# Patient Record
Sex: FEMALE | Race: BLACK OR AFRICAN AMERICAN | NOT HISPANIC OR LATINO | Employment: OTHER | RURAL
[De-identification: names, ages, dates, MRNs, and addresses within clinical notes are randomized per-mention and may not be internally consistent; named-entity substitution may affect disease eponyms.]

---

## 2021-03-23 ENCOUNTER — OFFICE VISIT (OUTPATIENT)
Dept: FAMILY MEDICINE | Facility: CLINIC | Age: 56
End: 2021-03-23
Payer: COMMERCIAL

## 2021-03-23 VITALS
SYSTOLIC BLOOD PRESSURE: 156 MMHG | BODY MASS INDEX: 31.22 KG/M2 | TEMPERATURE: 98 F | OXYGEN SATURATION: 100 % | RESPIRATION RATE: 18 BRPM | WEIGHT: 206 LBS | HEIGHT: 68 IN | HEART RATE: 81 BPM | DIASTOLIC BLOOD PRESSURE: 94 MMHG

## 2021-03-23 DIAGNOSIS — M79.641 RIGHT HAND PAIN: ICD-10-CM

## 2021-03-23 DIAGNOSIS — C54.1 ENDOMETRIAL CANCER: ICD-10-CM

## 2021-03-23 DIAGNOSIS — M79.605 PAIN IN BOTH LOWER EXTREMITIES: ICD-10-CM

## 2021-03-23 DIAGNOSIS — R03.0 ELEVATED BP WITHOUT DIAGNOSIS OF HYPERTENSION: ICD-10-CM

## 2021-03-23 DIAGNOSIS — R22.1 NECK MASS: ICD-10-CM

## 2021-03-23 DIAGNOSIS — M79.604 PAIN IN BOTH LOWER EXTREMITIES: ICD-10-CM

## 2021-03-23 DIAGNOSIS — D06.9 CIN III (CERVICAL INTRAEPITHELIAL NEOPLASIA GRADE III) WITH SEVERE DYSPLASIA: ICD-10-CM

## 2021-03-23 DIAGNOSIS — E78.5 HYPERLIPIDEMIA, UNSPECIFIED HYPERLIPIDEMIA TYPE: ICD-10-CM

## 2021-03-23 DIAGNOSIS — K21.9 GASTROESOPHAGEAL REFLUX DISEASE WITHOUT ESOPHAGITIS: Primary | ICD-10-CM

## 2021-03-23 DIAGNOSIS — R87.619 ATYPICAL GLANDULAR CELLS OF UNDETERMINED SIGNIFICANCE (AGUS) ON CERVICAL PAP SMEAR: ICD-10-CM

## 2021-03-23 DIAGNOSIS — I87.2 PERIPHERAL VENOUS INSUFFICIENCY: ICD-10-CM

## 2021-03-23 PROCEDURE — 99214 PR OFFICE/OUTPT VISIT, EST, LEVL IV, 30-39 MIN: ICD-10-PCS | Mod: 25,,, | Performed by: NURSE PRACTITIONER

## 2021-03-23 PROCEDURE — 96372 THER/PROPH/DIAG INJ SC/IM: CPT | Mod: ,,, | Performed by: NURSE PRACTITIONER

## 2021-03-23 PROCEDURE — 99214 OFFICE O/P EST MOD 30 MIN: CPT | Mod: 25,,, | Performed by: NURSE PRACTITIONER

## 2021-03-23 PROCEDURE — 96372 PR INJECTION,THERAP/PROPH/DIAG2ST, IM OR SUBCUT: ICD-10-PCS | Mod: ,,, | Performed by: NURSE PRACTITIONER

## 2021-03-23 RX ORDER — KETOROLAC TROMETHAMINE 30 MG/ML
60 INJECTION, SOLUTION INTRAMUSCULAR; INTRAVENOUS ONCE
Status: COMPLETED | OUTPATIENT
Start: 2021-03-23 | End: 2021-03-23

## 2021-03-23 RX ORDER — METHYLPREDNISOLONE ACETATE 40 MG/ML
40 INJECTION, SUSPENSION INTRA-ARTICULAR; INTRALESIONAL; INTRAMUSCULAR; SOFT TISSUE ONCE
Status: COMPLETED | OUTPATIENT
Start: 2021-03-23 | End: 2021-03-23

## 2021-03-23 RX ORDER — IBUPROFEN 800 MG/1
800 TABLET ORAL 3 TIMES DAILY
Qty: 90 TABLET | Refills: 2 | Status: SHIPPED | OUTPATIENT
Start: 2021-03-23 | End: 2021-06-21

## 2021-03-23 RX ORDER — DEXAMETHASONE SODIUM PHOSPHATE 4 MG/ML
4 INJECTION, SOLUTION INTRA-ARTICULAR; INTRALESIONAL; INTRAMUSCULAR; INTRAVENOUS; SOFT TISSUE ONCE
Status: COMPLETED | OUTPATIENT
Start: 2021-03-23 | End: 2021-03-23

## 2021-03-23 RX ORDER — GABAPENTIN 300 MG/1
300 CAPSULE ORAL NIGHTLY
COMMUNITY
Start: 2020-06-15

## 2021-03-23 RX ORDER — TIZANIDINE 4 MG/1
4 TABLET ORAL EVERY 6 HOURS PRN
Qty: 60 TABLET | Refills: 2 | Status: SHIPPED | OUTPATIENT
Start: 2021-03-23 | End: 2021-06-21

## 2021-03-23 RX ADMIN — DEXAMETHASONE SODIUM PHOSPHATE 4 MG: 4 INJECTION, SOLUTION INTRA-ARTICULAR; INTRALESIONAL; INTRAMUSCULAR; INTRAVENOUS; SOFT TISSUE at 09:03

## 2021-03-23 RX ADMIN — KETOROLAC TROMETHAMINE 60 MG: 30 INJECTION, SOLUTION INTRAMUSCULAR; INTRAVENOUS at 09:03

## 2021-03-23 RX ADMIN — METHYLPREDNISOLONE ACETATE 40 MG: 40 INJECTION, SUSPENSION INTRA-ARTICULAR; INTRALESIONAL; INTRAMUSCULAR; SOFT TISSUE at 09:03

## 2021-04-20 ENCOUNTER — OFFICE VISIT (OUTPATIENT)
Dept: FAMILY MEDICINE | Facility: CLINIC | Age: 56
End: 2021-04-20
Payer: COMMERCIAL

## 2021-04-20 VITALS
WEIGHT: 204.38 LBS | OXYGEN SATURATION: 97 % | SYSTOLIC BLOOD PRESSURE: 133 MMHG | HEIGHT: 67 IN | HEART RATE: 92 BPM | BODY MASS INDEX: 32.08 KG/M2 | DIASTOLIC BLOOD PRESSURE: 85 MMHG | TEMPERATURE: 97 F | RESPIRATION RATE: 16 BRPM

## 2021-04-20 DIAGNOSIS — G62.9 NEUROPATHY: ICD-10-CM

## 2021-04-20 DIAGNOSIS — I87.2 PERIPHERAL VENOUS INSUFFICIENCY: ICD-10-CM

## 2021-04-20 DIAGNOSIS — M62.838 MUSCLE SPASM: ICD-10-CM

## 2021-04-20 DIAGNOSIS — M25.50 MULTIPLE JOINT PAIN: Primary | ICD-10-CM

## 2021-04-20 PROCEDURE — 99214 OFFICE O/P EST MOD 30 MIN: CPT | Mod: ,,, | Performed by: NURSE PRACTITIONER

## 2021-04-20 PROCEDURE — 99214 PR OFFICE/OUTPT VISIT, EST, LEVL IV, 30-39 MIN: ICD-10-PCS | Mod: ,,, | Performed by: NURSE PRACTITIONER

## 2021-04-22 PROBLEM — M62.838 MUSCLE SPASM: Status: ACTIVE | Noted: 2021-04-22

## 2021-04-22 PROBLEM — M25.50 MULTIPLE JOINT PAIN: Status: ACTIVE | Noted: 2021-04-22

## 2021-04-22 PROBLEM — G62.9 NEUROPATHY: Status: ACTIVE | Noted: 2021-04-22

## 2022-02-09 ENCOUNTER — HOSPITAL ENCOUNTER (OUTPATIENT)
Dept: RADIOLOGY | Facility: HOSPITAL | Age: 57
Discharge: HOME | End: 2022-02-09
Attending: NURSE PRACTITIONER
Payer: COMMERCIAL

## 2022-02-09 ENCOUNTER — OFFICE VISIT (OUTPATIENT)
Dept: VASCULAR SURGERY | Facility: CLINIC | Age: 57
End: 2022-02-09
Payer: COMMERCIAL

## 2022-02-09 VITALS
HEIGHT: 67 IN | BODY MASS INDEX: 31.05 KG/M2 | HEART RATE: 72 BPM | SYSTOLIC BLOOD PRESSURE: 130 MMHG | RESPIRATION RATE: 12 BRPM | WEIGHT: 197.81 LBS | DIASTOLIC BLOOD PRESSURE: 80 MMHG

## 2022-02-09 DIAGNOSIS — R60.0 LOWER EXTREMITY EDEMA: Primary | ICD-10-CM

## 2022-02-09 DIAGNOSIS — M79.605 PAIN IN BOTH LOWER EXTREMITIES: ICD-10-CM

## 2022-02-09 DIAGNOSIS — R60.0 LOWER EXTREMITY EDEMA: ICD-10-CM

## 2022-02-09 DIAGNOSIS — M79.604 PAIN IN BOTH LOWER EXTREMITIES: ICD-10-CM

## 2022-02-09 DIAGNOSIS — I87.2 CHRONIC VENOUS INSUFFICIENCY OF LOWER EXTREMITY: ICD-10-CM

## 2022-02-09 PROCEDURE — 3075F SYST BP GE 130 - 139MM HG: CPT | Mod: CPTII,,, | Performed by: NURSE PRACTITIONER

## 2022-02-09 PROCEDURE — 93970 EXTREMITY STUDY: CPT | Mod: 26,,, | Performed by: FAMILY MEDICINE

## 2022-02-09 PROCEDURE — 1159F PR MEDICATION LIST DOCUMENTED IN MEDICAL RECORD: ICD-10-PCS | Mod: CPTII,,, | Performed by: NURSE PRACTITIONER

## 2022-02-09 PROCEDURE — 3008F PR BODY MASS INDEX (BMI) DOCUMENTED: ICD-10-PCS | Mod: CPTII,,, | Performed by: NURSE PRACTITIONER

## 2022-02-09 PROCEDURE — 99204 PR OFFICE/OUTPT VISIT, NEW, LEVL IV, 45-59 MIN: ICD-10-PCS | Mod: S$PBB,,, | Performed by: NURSE PRACTITIONER

## 2022-02-09 PROCEDURE — 3075F PR MOST RECENT SYSTOLIC BLOOD PRESS GE 130-139MM HG: ICD-10-PCS | Mod: CPTII,,, | Performed by: NURSE PRACTITIONER

## 2022-02-09 PROCEDURE — 99204 OFFICE O/P NEW MOD 45 MIN: CPT | Mod: S$PBB,,, | Performed by: NURSE PRACTITIONER

## 2022-02-09 PROCEDURE — 99213 OFFICE O/P EST LOW 20 MIN: CPT | Mod: PBBFAC,25 | Performed by: NURSE PRACTITIONER

## 2022-02-09 PROCEDURE — 3079F PR MOST RECENT DIASTOLIC BLOOD PRESSURE 80-89 MM HG: ICD-10-PCS | Mod: CPTII,,, | Performed by: NURSE PRACTITIONER

## 2022-02-09 PROCEDURE — 93970 EXTREMITY STUDY: CPT | Mod: TC

## 2022-02-09 PROCEDURE — 93970 US VENOUS REFLUX STUDY BILATERAL: ICD-10-PCS | Mod: 26,,, | Performed by: FAMILY MEDICINE

## 2022-02-09 PROCEDURE — 3008F BODY MASS INDEX DOCD: CPT | Mod: CPTII,,, | Performed by: NURSE PRACTITIONER

## 2022-02-09 PROCEDURE — 3079F DIAST BP 80-89 MM HG: CPT | Mod: CPTII,,, | Performed by: NURSE PRACTITIONER

## 2022-02-09 PROCEDURE — 1159F MED LIST DOCD IN RCRD: CPT | Mod: CPTII,,, | Performed by: NURSE PRACTITIONER

## 2022-02-09 RX ORDER — ALBUTEROL SULFATE 90 UG/1
AEROSOL, METERED RESPIRATORY (INHALATION)
COMMUNITY
Start: 2022-01-19

## 2022-02-09 RX ORDER — NAPROXEN 500 MG/1
500 TABLET ORAL 2 TIMES DAILY WITH MEALS
COMMUNITY
Start: 2021-12-14 | End: 2022-02-16 | Stop reason: SDUPTHER

## 2022-02-09 RX ORDER — IBUPROFEN 600 MG/1
TABLET ORAL
COMMUNITY
Start: 2022-01-19 | End: 2024-03-21

## 2022-02-09 RX ORDER — DULOXETIN HYDROCHLORIDE 30 MG/1
CAPSULE, DELAYED RELEASE ORAL
COMMUNITY
Start: 2021-12-14 | End: 2022-02-16 | Stop reason: SDUPTHER

## 2022-02-09 RX ORDER — TIZANIDINE 4 MG/1
4 TABLET ORAL EVERY 6 HOURS PRN
COMMUNITY

## 2022-02-09 NOTE — PROGRESS NOTES
[facility] CLINIC NOTE    Patient ID: Kaylyn Silva is a 56 y.o. female.    I. HISTORY     Chief Complaint:   Chief Complaint   Patient presents with    Referral     New pt referred by BETH Norwood for bilateral leg pain and edema.    Edema    Leg Pain        HPI: Kaylyn Silva is a 56 y.o. female who is referred here today by BETH Norwood  for evaluation of bilateral leg pain & swelling.  Symptoms are as described in the chart below and began a few  years ago.  Location is bilateral lower legs. Symptoms are progressive at the end of the day.  History of venous interventions includes None.  No known family history of venous disease.  No history of DVT. No history of HF or RF. Patient walks with assist of a cane she reports significant Neuropathy after having Chemotherapy treatments for treatment of Ovarian Cancer in 2017.     Venous Disease Medical Necessity Documentation Initial Visit Date: 2-9-22 Return Check Date:    1. Have you ever had a rupture or bleed from a varicose vein in your leg(s)?              [] Yes  [x] No   [] Yes   [] No   2. Have you ever been diagnosed with phlebitis, cellulitis, or inflammation in the area of the varicose veins of  your leg(s)?  [] Yes  [x] No    [] Yes   [] No   3. Do you have darkened or inflamed skin on your legs?   [] Yes   [x] No   [] Yes   [] No   4. Do you have leg swelling?     [x] Yes   [] No   [] Yes   [] No   5. Do you have leg pain?   [x] Yes   [] No   [] Yes   [] No   If yes, describe the type of pain?    [x]   Stabbing [x]  Radiating [x]  Aching   []  Tightness [x]  Throbbing                 [x]  Burning [x]  Cramping              6. Do you have leg discomfort?   [x] Yes   [] No   [] Yes   [] No   If yes, describe the type of discomfort?    [x]  Heaviness []  Fullness   [x]  Restlessness [x] Tired/Fatigued [] Itching              7. Have you ever worn compression hose?   [] Yes   [x] No   [] Yes   [] No   If yes, how long?           8. Do you  elevate your legs while sitting?   [x] Yes   [] No   [] Yes   [] No   9. Does venous disease (varicose veins, ulcers, skin changes, leg pain/swelling) interfere with your daily life?  If yes, check activities you are limited or unable to do.    []  Shower  []   Walk  []  Exercise  [x] Play with children/grandchildren  []  Shop [] Work [x] Stand for any period of time [x] Sleep                                 [x] Sitting for an extended period of time.           [x] Yes   [] No   [] Yes   [] No   10. Do you exercise/have you tried to exercise (i.e.  Walk our participate in a regular exercise routine)?  [x] Yes, calf raises and walks  [] No   [] Yes   [] No   11. BMI   31           Past Medical History:   Diagnosis Date    Anxiety state 2012    Chemotherapy-induced neuropathy     Depression, unspecified     Edema of lower extremity     Endometrial cancer     GERD (gastroesophageal reflux disease) 2012    Pain in leg, unspecified     Paresthesia of skin         Past Surgical History:   Procedure Laterality Date     SECTION      2 times    HAND SURGERY Left 1985    partial amp of thumb and index finger    HYSTERECTOMY      MASS EXCISION      Back of neck    VAGINAL DELIVERY      X1       Social History     Tobacco Use   Smoking Status Never Smoker   Smokeless Tobacco Never Used         Current Outpatient Medications:     albuterol (PROVENTIL/VENTOLIN HFA) 90 mcg/actuation inhaler, INHALE 1 PUFF BY MOUTH EVERY 4 HOURS AS NEEDED ...SHAKE WELL BEFORE USING..., Disp: , Rfl:     DULoxetine (CYMBALTA) 30 MG capsule, TAKE 1 CAPSULE BY MOUTH EVERY DAY CAUSES DROWSINESS-AVOID ALCOHOL!!, Disp: , Rfl:     gabapentin (NEURONTIN) 300 MG capsule, Take 300 mg by mouth every evening., Disp: , Rfl:     ibuprofen (ADVIL,MOTRIN) 600 MG tablet, TAKE 1 TABLET BY MOUTH 4 TIMES DAILY WITH FOOD OR MILK ...STOP TAKING ALL OTHER NSAIDS WHILE TAKING THIS MEDICATION, Disp: , Rfl:     naproxen (NAPROSYN) 500 MG  tablet, Take 500 mg by mouth 2 (two) times daily with meals. As needed for pain, Disp: , Rfl:     tiZANidine (ZANAFLEX) 4 MG tablet, Take 4 mg by mouth every 6 (six) hours as needed (muscle spasms)., Disp: , Rfl:     Review of Systems   Constitutional: Negative for fatigue and fever.   Eyes: Negative for pain.   Respiratory: Negative for chest tightness and shortness of breath.    Cardiovascular: Positive for leg swelling. Negative for chest pain.   Gastrointestinal: Negative for abdominal pain.   Musculoskeletal: Positive for leg pain.   Neurological: Negative for syncope.   Psychiatric/Behavioral: Negative for sleep disturbance.          II. PHYSICAL EXAM     Physical Exam  Vitals reviewed.   Constitutional:       General: She is not in acute distress.     Appearance: She is obese.   HENT:      Head: Normocephalic.   Eyes:      Pupils: Pupils are equal, round, and reactive to light.   Cardiovascular:      Rate and Rhythm: Normal rate and regular rhythm.      Comments: Biphasic dopplerable signals both feet.   Pulmonary:      Effort: Pulmonary effort is normal.   Abdominal:      Palpations: Abdomen is soft.   Musculoskeletal:         General: No swelling. Normal range of motion.      Cervical back: Neck supple.      Right lower leg: Edema present.      Left lower leg: Edema present.      Comments: 02/09/2022 measurements:  Trunk: 116.5 cm  Right thigh: 64.5 cm, calf: 38 cm, ankle: 23 cm  Left Thigh: 63 cm, calf: 40 cm, ankle: 23.5 cm   Skin:     General: Skin is warm and dry.   Neurological:      Mental Status: She is alert and oriented to person, place, and time.           CEAP Classification  Clinical Signs: Class 3 - Edema  Etiologic Classification: Secondary  Anatomic distribution: Superficial  Pathophysiologic dysfunction: Reflux       Venous Clinical Severity Score  Pain:2=Daily, moderate activity limitation, occasional analgesics  Varicose Veins: 1=Few, scattered. Branch varicose veins  Venous Edema:  3=Morning edema above ankle and requiring activity change, elevation    Inflammation: 0=None  Induration: 0=None  Number of Active Ulcers: 0=0  Active Ulceration, Duration: 0=None  Active Ulcer Size: 0=None  Compressive Therapy: 0=Not used or not compliant         III. ASSESSMENT & PLAN (MEDICAL DECISION MAKING)     1. Lower extremity edema    2. Pain in both lower extremities    3. Chronic venous insufficiency of lower extremity        Assessment/Diagnosis and Plan:  Patient has complaints, symptoms and physical exam findings of chronic venous disease.  Therefore, I will order a bilateral complete venous reflux study and see the patient back with results. Venous reflux study performed today showed reflux in bilateral GSV's and the right SSV . Study negative for DVT. Thorough discussion of test findings and plan of care. She verbalized understanding and agrees with plan.  Will start conservative management of 20-30 mmHg compression, therapeutic leg elevation and calf pumping exercises. FU in 3 months with Dr. Gomes.     Orders Placed This Encounter    US Venous Reflux Study Bilateral          BETH Anthony

## 2022-02-16 ENCOUNTER — OFFICE VISIT (OUTPATIENT)
Dept: NEUROLOGY | Facility: CLINIC | Age: 57
End: 2022-02-16
Payer: COMMERCIAL

## 2022-02-16 VITALS
BODY MASS INDEX: 31.29 KG/M2 | DIASTOLIC BLOOD PRESSURE: 84 MMHG | HEART RATE: 91 BPM | OXYGEN SATURATION: 97 % | WEIGHT: 199.38 LBS | HEIGHT: 67 IN | SYSTOLIC BLOOD PRESSURE: 122 MMHG

## 2022-02-16 DIAGNOSIS — G47.30 SLEEP APNEA, UNSPECIFIED TYPE: ICD-10-CM

## 2022-02-16 DIAGNOSIS — G60.9 IDIOPATHIC PERIPHERAL NEUROPATHY: ICD-10-CM

## 2022-02-16 DIAGNOSIS — R51.9 INTRACTABLE HEADACHE, UNSPECIFIED CHRONICITY PATTERN, UNSPECIFIED HEADACHE TYPE: Primary | ICD-10-CM

## 2022-02-16 DIAGNOSIS — G56.03 BILATERAL CARPAL TUNNEL SYNDROME: ICD-10-CM

## 2022-02-16 PROBLEM — T45.1X5A CHEMOTHERAPY-INDUCED NEUROPATHY: Status: ACTIVE | Noted: 2021-06-14

## 2022-02-16 PROBLEM — G62.0 CHEMOTHERAPY-INDUCED NEUROPATHY: Status: ACTIVE | Noted: 2021-06-14

## 2022-02-16 PROCEDURE — 3074F SYST BP LT 130 MM HG: CPT | Mod: CPTII,,, | Performed by: NURSE PRACTITIONER

## 2022-02-16 PROCEDURE — 3079F PR MOST RECENT DIASTOLIC BLOOD PRESSURE 80-89 MM HG: ICD-10-PCS | Mod: CPTII,,, | Performed by: NURSE PRACTITIONER

## 2022-02-16 PROCEDURE — 99204 OFFICE O/P NEW MOD 45 MIN: CPT | Mod: S$PBB,,, | Performed by: NURSE PRACTITIONER

## 2022-02-16 PROCEDURE — 1160F PR REVIEW ALL MEDS BY PRESCRIBER/CLIN PHARMACIST DOCUMENTED: ICD-10-PCS | Mod: CPTII,,, | Performed by: NURSE PRACTITIONER

## 2022-02-16 PROCEDURE — 1159F MED LIST DOCD IN RCRD: CPT | Mod: CPTII,,, | Performed by: NURSE PRACTITIONER

## 2022-02-16 PROCEDURE — 3079F DIAST BP 80-89 MM HG: CPT | Mod: CPTII,,, | Performed by: NURSE PRACTITIONER

## 2022-02-16 PROCEDURE — 1160F RVW MEDS BY RX/DR IN RCRD: CPT | Mod: CPTII,,, | Performed by: NURSE PRACTITIONER

## 2022-02-16 PROCEDURE — 3074F PR MOST RECENT SYSTOLIC BLOOD PRESSURE < 130 MM HG: ICD-10-PCS | Mod: CPTII,,, | Performed by: NURSE PRACTITIONER

## 2022-02-16 PROCEDURE — 99215 OFFICE O/P EST HI 40 MIN: CPT | Mod: PBBFAC | Performed by: NURSE PRACTITIONER

## 2022-02-16 PROCEDURE — 99204 PR OFFICE/OUTPT VISIT, NEW, LEVL IV, 45-59 MIN: ICD-10-PCS | Mod: S$PBB,,, | Performed by: NURSE PRACTITIONER

## 2022-02-16 PROCEDURE — 1159F PR MEDICATION LIST DOCUMENTED IN MEDICAL RECORD: ICD-10-PCS | Mod: CPTII,,, | Performed by: NURSE PRACTITIONER

## 2022-02-16 PROCEDURE — 3008F PR BODY MASS INDEX (BMI) DOCUMENTED: ICD-10-PCS | Mod: CPTII,,, | Performed by: NURSE PRACTITIONER

## 2022-02-16 PROCEDURE — 3008F BODY MASS INDEX DOCD: CPT | Mod: CPTII,,, | Performed by: NURSE PRACTITIONER

## 2022-02-16 RX ORDER — DULOXETIN HYDROCHLORIDE 30 MG/1
CAPSULE, DELAYED RELEASE ORAL
Qty: 60 CAPSULE | Refills: 3 | Status: SHIPPED | OUTPATIENT
Start: 2022-02-16 | End: 2022-05-23 | Stop reason: SDUPTHER

## 2022-02-16 RX ORDER — PROMETHAZINE HYDROCHLORIDE 12.5 MG/1
TABLET ORAL
Qty: 30 TABLET | Refills: 3 | Status: SHIPPED | OUTPATIENT
Start: 2022-02-16 | End: 2022-05-23 | Stop reason: SDUPTHER

## 2022-02-16 RX ORDER — NAPROXEN 500 MG/1
TABLET ORAL
Qty: 30 TABLET | Refills: 3 | Status: SHIPPED | OUTPATIENT
Start: 2022-02-16 | End: 2022-08-30 | Stop reason: SDUPTHER

## 2022-02-16 NOTE — LETTER
February 16, 2022        Miranda Morrison, BETH  [address] [place] [address]             [facility] - Neurology  [address]  [place] [address]  Phone: [phone]  Fax: [phone]   Patient: Kaylyn Silva   MR Number: [id number]   YOB: 1965   Date of Visit: 2/16/2022       Dear Dr. Morrison:    Thank you for referring Kaylyn Silva to me for evaluation. Attached you will find relevant portions of my assessment and plan of care.    If you have questions, please do not hesitate to call me. I look forward to following Kaylyn Silva along with you.    Sincerely,      Brendon Yu, ANGE            CC  No Recipients    Enclosure

## 2022-02-16 NOTE — PATIENT INSTRUCTIONS
Patient Education       Migraines Discharge Instructions   About this topic   A migraine is a specific type of headache. All headaches are not migraines. A migraine is caused by abnormal brain activity. It may be due to widening or narrowing of the blood vessels in the head. It may last for a couple of hours or even a few days.  There are two types of migraine headaches:  · Classic migraine or migraine with aura ? It often starts with some problems in your eyesight called auras. You may see spots, dots, or even zigzag lines. Some people will lose part of their vision. An aura is the signal that a migraine is coming. It is followed by a very bad headache on one side of the head. Noise, light, and other activities can make it worse. Sometimes, it comes with upset stomach and throwing up.  · Common migraine or migraine without aura ? This migraine happens without the signal that the headache is coming.  Migraines can be treated with different drugs. Some drugs treat the pain. Other drugs stop the brain activity that causes the migraine. If migraines happen often, drugs that prevent migraines can help. Migraines can be helped by sleep. Stress control like exercise, relaxation, and a regular routine of good nutrition and sleep are all helpful in preventing migraines. Lifestyle and dietary changes may also help you deal with a migraine.         What care is needed at home?   · Ask your doctor what you need to do when you go home. Make sure you ask questions if you do not understand what the doctor says. This way you will know what you need to do.  · Your doctor may give you drugs to help with the pain. Take them as ordered by your doctor.  · Your doctor may teach you some ways to help control your migraine like relaxation and exercises.  · Keep a diary about your headaches. Write down when your headache happens. Write down what you were doing before it happened. Write down any foods or drinks that you had in the last day.  This will help you learn what might be causing your headaches. Then, you can learn how to avoid them.  · Place an ice pack or a bag of frozen peas wrapped in a towel over your head. Never put ice right on the skin. Do not leave the ice on more than 10 to 15 minutes at a time.  · Using heat may also help. Try using a heating pad on the back of your neck. A warm shower or bath may also relax tight muscles.  · Avoid bright lights and noise. Rest in a quiet, dark room. Sleep may also help.  · Drink a caffeinated beverage. Be careful to not drink too much caffeine as this can cause other headaches.  · Do not make any big decisions until your migraine goes away.  · If certain drugs your doctor ordered trigger your migraine, your doctor may tell you to stop taking those drugs.  What follow-up care is needed?   · Your doctor may ask you to make visits to the office to check on your progress. Be sure to keep these visits.  · Your doctor may send you to a specialist called a neurologist.  · Your doctor may request that you get a brain MRI.  What drugs may be needed?   The doctor may order drugs to:  · Help with pain  · Relieve the migraine  · Prevent a migraine attack  · Treat an upset stomach and throwing up  · Treat a hormonal problem  Will physical activity be limited?   · Regular exercise can help prevent migraines. If exercise triggers your migraine, talk to your doctor.  · Do not drive or run machinery until your migraine goes away.  What changes to diet are needed?   · Do not skip or delay meals. Drink 6 to 8 glasses of water each day. This may help prevent migraines.  · Avoid foods that may trigger the attack. These may include processed, fermented, pickled, or marinated foods. Some of these are baked goods, chocolate, dairy products, nuts, onions, and peanut butter. Others are fruits like avocado, banana, or citrus fruit and meats like jimenez, hot dogs, and cured meats.  · Limit caffeine intake. You will find that  caffeine may help relieve pain. But, too much caffeine may also trigger an attack.  · Avoid drinking beer, wine, and mixed drinks (alcohol) if you think this is causing your migraines.  · Quit smoking. Smoking can worsen your migraine.  What problems could happen?   · Loss of work time or missing school if migraines come often  · Low mood, worry  · Poor quality of life  · Migraines can slightly raise your chances of having a stroke   What can be done to prevent this health problem?   · Some drugs may help prevent migraines. Talk to your doctor about the drugs you may need to take.  · Keep a sleeping routine. Go to sleep and get up the same time every day.  · Be active. Exercise can reduce your risk of having migraines.  · Take note of the things that may trigger your migraine. Learning what they are is very important to help avoid an attack.  · Try to keep stress in your life low. Try relaxation exercises or meditation to lower stress.  When do I need to call the doctor?   · Signs of a bad reaction. These include very bad headache beyond the usual; speech, vision, motion problems or loss of balance; headaches are worse when lying down or headaches suddenly starts. Call for emergency help right away.  · Changes in migraine attacks  · Migraines that happen more often than 3 times a month  · You are not feeling better in 2 to 3 days or you are feeling worse  Teach Back: Helping You Understand   The Teach Back Method helps you understand the information we are giving you. After you talk with the staff, tell them in your own words what you learned. This helps to make sure the staff has described each thing clearly. It also helps to explain things that may have been confusing. Before going home, make sure you can do these:  · I can tell you about my condition.  · I can tell you what may help ease my pain.  · I can tell you what I will do if there is a change in my headaches.  Where can I learn more?   American Academy of  Pediatrics  https://www.healthychildren.org/English/health-issues/conditions/head-neck-nervous-system/Pages/Migraine-Headaches-in-Children.aspx   NHS Choices  http://www.nhs.uk/Conditions/Migraine/Pages/Causes.aspx   Last Reviewed Date   2020-05-12  Consumer Information Use and Disclaimer   This information is not specific medical advice and does not replace information you receive from your health care provider. This is only a brief summary of general information. It does NOT include all information about conditions, illnesses, injuries, tests, procedures, treatments, therapies, discharge instructions or life-style choices that may apply to you. You must talk with your health care provider for complete information about your health and treatment options. This information should not be used to decide whether or not to accept your health care providers advice, instructions or recommendations. Only your health care provider has the knowledge and training to provide advice that is right for you.  Copyright   Copyright © 2021 [employer] and its affiliates and/or licensors. All rights reserved.  Patient Education       Headache Discharge Instructions, Adult   About this topic   Headache is the word used to describe aching or pain in the head. There are many types of headaches. Some of them are:  · Headaches that are from an illness or injury. These may be caused from a virus or other infection. They can also happen when you do not get enough to drink.  · Tension headaches may have mild to moderate pain. The pain may feel like it is squeezing, pressure, dull, or aching. You may have pain in the front, back, or both sides of head. Tension headaches are not often bad enough to keep you from doing daily activities. Some people may not feel like doing anything while they have the headache. Tension headaches can last from 30 minutes to 7 days.  · Migraine headaches may cause moderate to severe pain. The pain may throb on  "one or both sides of the head. These headaches often start off mild and get worse. You are often not able to do normal activities. This kind of headache may also have other signs with it like throwing up and not being able to be around light or sound.  · Cluster headaches are severe and happen again and again but for short periods of time. The pain is burning, sharp, and keeps hurting. The pain may happen behind or around your eye. It can also be on one side of your face. Signs can include a stuffed, runny nose and red, watery eye on the side of pain. They can happen because of drinking alcohol, smoking, heat, and bright lights. Some drugs can also cause this type of headache.  · A sinus headache is often either a migraine or tension headache. Sinusitis should not cause repeat headaches If you have pain over your nose or sinuses, a fever and thick liquid coming from your nose, you may have a sinus infection.  · Medication overuse headaches can happen if you have had many headaches and have taken headache medicine to help with them.  Not all headaches need to be checked by a doctor. Some kinds may be a sign of a serious problem. Care for headaches will depend on what is causing them.  What care is needed at home?   · Ask your doctor what you need to do when you go home. Make sure you ask questions if you do not understand what the doctor says.  · You can take drugs like acetaminophen, ibuprofen, or naproxen for pain as instructed, but use of these pain medicines should be limited. If you need to take pain medicines every day for headaches, call your doctor.  · If possible, lie down in a quiet, dark room.  · Make sure you eat at regular times. Do not skip meals. Drink plenty of fluids. Be sure you are getting enough sleep.  · If you have frequent headaches that interfere with your activities, you can keep a "headache diary." This might help to see if there is a pattern to your headaches. Make notes about:  ? Where " your pain is on your head or neck.  ? When you have the pain and how long it lasts.  ? How your pain feels. Is it dull, sharp, burning, stabbing, or cramping?  ? What causes your pain?  ? What makes your pain better or worse?     What follow-up care is needed?   · Your doctor may ask you to make visits to the office to check on your progress. Be sure to keep these visits.  · Your doctor may want to do tests if the headache comes back. The results will help the doctor understand what kind of headache you have and what causes it. Together you can make a plan for more care.  What drugs may be needed?   Your doctor may order drugs based on the type of headache you have. The doctor may order drugs to:  · Help with pain  · Prevent or stop the headache  · Treat upset stomach and throwing up  · Treat high blood pressure  · Treat low mood  · Treat hormonal imbalance  Will physical activity be limited?   Headaches may be painful enough to stop you from doing your normal activities. The pain may make you stay at home from work or school.  What problems could happen?   Headache may be part of a more serious health problem.  What can be done to prevent this health problem?   · Take the drugs your doctor prescribes. Some may help to keep from getting headaches. Your doctor may give you drugs to try to stop the headache or lower how long the headache lasts.  · Avoid stress. Learn how to cope with things that cause stress. Try to relax. Do relaxation exercises daily like deep breathing, meditation, or yoga.  · Avoid alcohol and smoking. These can make headaches worse.  · Hold the phone rather than resting it on your shoulder, or use a headset.  · Maintain good posture and exercise regularly.  When do I need to call the doctor?   · You have a seizure.  · You have signs of stroke like sudden:  ? Numbness or weakness of the face, arm, or leg, especially on one side of the body.  ? Confusion, trouble speaking, or  understanding.  ? Trouble seeing in one or both eyes.  ? Trouble walking, dizziness, loss of balance, or coordination.  ? Severe headache with no known cause.  · You feel extremely weak, confused, or lethargic, or you pass out.  · You have a headache along with neck pain, neck stiffness, fever, or chills.  · You have a headache along with a new skin rash.  · You have significant nausea or vomiting with your headache.  · The headache lasts more than a few days or the pain gets worse or comes more often.  Teach Back: Helping You Understand   The Teach Back Method helps you understand the information we are giving you. After you talk with the staff, tell them in your own words what you learned. This helps to make sure the staff has described each thing clearly. It also helps to explain things that may have been confusing. Before going home, make sure you can do these:  · I can tell you about my condition.  · I can tell you what may help ease my pain.  · I can tell you what I will do if there is a change in my headaches.  Where can I learn more?   American Academy of Family Physicians  http://familydoctor.org/familydoctor/en/diseases-conditions/headaches.html   National [place] of Neurological Disorders and Stroke  https://www.ninds.nih.gov/Disorders/All-Disorders/Headache-Information-Page   NHS Choices  http://www.nhs.uk/conditions/headache/Pages/Introduction.aspx   Last Reviewed Date   2021-06-16  Consumer Information Use and Disclaimer   This information is not specific medical advice and does not replace information you receive from your health care provider. This is only a brief summary of general information. It does NOT include all information about conditions, illnesses, injuries, tests, procedures, treatments, therapies, discharge instructions or life-style choices that may apply to you. You must talk with your health care provider for complete information about your health and treatment options. This  "information should not be used to decide whether or not to accept your health care providers advice, instructions or recommendations. Only your health care provider has the knowledge and training to provide advice that is right for you.  Copyright   Copyright © 2021 UpToDate, Inc. and its affiliates and/or licensors. All rights reserved.  Patient Education       Sleep Apnea in Adults   The Basics   Written by the doctors and editors at [employer]   What is sleep apnea? -- Sleep apnea is a condition that makes you stop breathing for short periods while you are asleep. There are 2 types of sleep apnea. One is called "obstructive sleep apnea," and the other is called "central sleep apnea."  In obstructive sleep apnea, you stop breathing because your throat narrows or closes (figure 1). In central sleep apnea, you stop breathing because your brain does not send the right signals to your muscles to make you breathe. When people talk about sleep apnea, they are usually referring to obstructive sleep apnea, which is what this article is about.  People with sleep apnea do not know that they stop breathing when they are asleep. But they do sometimes wake up startled or gasping for breath. They also often hear from loved ones that they snore.  What are the symptoms of sleep apnea? -- The main symptoms of sleep apnea are loud snoring, tiredness, and daytime sleepiness. Other symptoms can include:  · Restless sleep  · Waking up choking or gasping  · Morning headaches, dry mouth, or sore throat  · Waking up often to urinate  · Waking up feeling unrested or groggy  · Trouble thinking clearly or remembering things  Some people with sleep apnea don't have symptoms, or they don't know they have them. They might figure that it's normal to be tired or to snore a lot.  Should I see a doctor or nurse? -- Yes. If you think you might have sleep apnea, see your doctor.  Is there a test for sleep apnea? -- Yes. If your doctor or nurse " "suspects you have sleep apnea, they might send you for a "sleep study." Sleep studies can sometimes be done at home, but they are usually done in a sleep lab. For the study, you spend the night in the lab, and you are hooked up to different machines that monitor your heart rate, breathing, and other body functions. The results of the test will tell your doctor or nurse if you have the disorder.  Is there anything I can do on my own to help my sleep apnea? -- Yes. Here are some things that might help:  · Stay off your back when sleeping. (This is not always practical, because people cannot control their position while asleep. Plus, it only helps some people.)  · Lose weight, if you are overweight  · Avoid alcohol, because it can make sleep apnea worse  How is sleep apnea treated? -- As mentioned above, weight loss can help if you are overweight or obese. But losing weight can be challenging, and it takes time to lose enough weight to help with your sleep apnea. Most people need other treatment while they work on losing weight.  The most effective treatment for sleep apnea is a device that keeps your airway open while you sleep. Treatment with this device is called "continuous positive airway pressure," or CPAP. People getting CPAP wear a face mask at night that keeps them breathing (figure 2).  If your doctor or nurse recommends a CPAP machine, try to be patient about using it. The mask might seem uncomfortable to wear at first, and the machine might seem noisy, but using the machine can really pay off. People with sleep apnea who use a CPAP machine feel more rested and generally feel better.  There is also another device that you wear in your mouth called an "oral appliance" or "mandibular advancement device." It also helps keep your airway open while you sleep. But devices do not work as well as CPAP for treating sleep apnea.  In rare cases, when nothing else helps, doctors recommend surgery to keep the airway " open. Surgery to do this is not always effective, and even when it is, the problem can come back.  Is sleep apnea dangerous? -- It can be. People with sleep apnea do not get good-quality sleep, so they are often tired and not alert. This puts them at risk for car accidents and other types of accidents. Plus, studies show that people with sleep apnea are more likely than others to have high blood pressure, heart attacks, and other serious heart problems. In people with severe sleep apnea, getting treated (for example, with a CPAP machine) can help prevent some of these problems.  All topics are updated as new evidence becomes available and our peer review process is complete.  This topic retrieved from [employer] on: Sep 21, 2021.  Topic 48618 Version 12.0  Release: 29.4.2 - C29.263  © 2021 UpToDate, Inc. and/or its affiliates. All rights reserved.  figure 1: Airway in a person with sleep apnea     Normally when a person sleeps, the airway remains open, and air can pass from the nose and mouth to the lungs. In a person with sleep apnea, parts of the throat and mouth drop into the airway and block off the flow of air. This can cause loud snoring and interrupt breathing for short periods.  Graphic 90058 Version 5.0    figure 2: Continuous positive airway pressure (CPAP) for sleep apnea     The CPAP mask gently blows air into your nose while you sleep. It puts just enough pressure on your airway to keep it from closing. The mask in this picture fits over just the nose. Other CPAP devices have masks that fit over the nose and mouth.  Graphic 71088 Version 5.0    Consumer Information Use and Disclaimer   This information is not specific medical advice and does not replace information you receive from your health care provider. This is only a brief summary of general information. It does NOT include all information about conditions, illnesses, injuries, tests, procedures, treatments, therapies, discharge instructions or  life-style choices that may apply to you. You must talk with your health care provider for complete information about your health and treatment options. This information should not be used to decide whether or not to accept your health care provider's advice, instructions or recommendations. Only your health care provider has the knowledge and training to provide advice that is right for you. The use of this information is governed by the [employer] End User License Agreement, available at https://www.ezeep/en/solutions/[employer]/about/catina.The use of [employer] content is governed by the [employer] Terms of Use. ©2021 UpToDate, Inc. All rights reserved.  Copyright   © 2021 UpToDate, Inc. and/or its affiliates. All rights reserved.  Patient Education       Paresthesia Discharge Instructions   About this topic   A paresthesia is the feeling of numbness, tingling, or a prickling sensation. This is often a result of pressure on the nerve itself. These nerves control the movement, and normal sensations of your fingers, hands, arms, feet, or legs. Paresthesias can also appear in other parts of your body. Paresthesias can also be caused by damage to the central nervous system, which includes the brain and spinal cord.  Treatment is based on the cause of paresthesia.  What care is needed at home?   · Ask your doctor what you need to do when you go home. Make sure you ask questions if you do not understand what the doctor says. This way you will know what you need to do.  · You may have a sling or brace on your shoulder, arm, or hand. This will help your arm or hand heal properly. Wear the sling at all times until you recover fully.  · You may need a brace on your leg or foot to help you walk.  · You may not be able to feel hot or cold very well. Be careful with hot surfaces like stoves and ovens. Be sure to cover your numb hand before you use hot machines or devices. Be careful when taking baths or showers. You may  burn your hand without knowing it. Be careful with cold weather. You may not feel anything with your numb arm or leg, including fingers and toes. Be sure to cover your limb before you go outside to avoid frostbite.  · Take extra care with activities. Avoid activities that may injure your numb arm or legs.  ? If you cannot feel your limb, you may stumble, fall, step, or touch harmful objects like nails. Walk on flat, clean areas.  ? If your foot is numb, use caution. Do not walk barefoot. Wear well fitted-shoes to protect your feet from injury.  · Watch for injuries. Change socks every day. Wash your feet with mild soap and pat dry. Check for any sores, calluses, or cracks. You may need to use a mirror to check the bottoms of your feet. If you have any sores or other open areas tell your doctor right away.  · Do not wear tight-fitting pants and socks. This can cut off some blood flow, which may cause numbness.  · Do not lace your shoes too tight. Do not use overly thick socks or insoles to fill space in your shoes. You may need special shoes to help protect your feet.  What follow-up care is needed?   Your doctor may ask you to make visits to the office to check on your progress. Be sure to keep these visits.  What drugs may be needed?   The doctor may order drugs based on the cause of paresthesia. These may include drugs to:  · Relieve tingling and numbness  · Help with pain  · Decrease numbness  · Help relieve burning feeling  · Treat the underlying cause  Will physical activity be limited?   · Do not sit or stand for a long period. Change the position of your arms and legs to avoid putting too much pressure on the limb.  · You may have trouble bearing weight on your numb leg. You may use a device such as a cane or walker, or have someone help you when you walk.  · Ask your doctor if an exercise program would benefit your health. This can help lower your blood pressure and make your heart stronger. Regular exercise  can help improve and lower the chance of other health problems.  What changes to diet are needed?   Eat a healthy diet. Poor nutrition may cause numbness and tingling. This means:  · Eat whole grain foods and foods high in fiber.  · Choose many different fruits and vegetables. Fresh or frozen is best.  · Cut back on solid fats like butter or margarine. Eat less fatty or processed foods.  · Eat more low fat or lean meats like chicken, fish, or turkey. Eat less red meat.  · Limit beer, wine, and mixed drinks (alcohol).  · Avoid caffeine.  · Follow any special diet you were told, such as for diabetes or high blood pressure.  · If you need help, ask to see a dietitian.  What problems could happen?   · Nerve damage  · Decreased movement of the leg or arm  · Pain  · Increased risk of falling  · Problem gets worse  What can be done to prevent this health problem?   · Stop drinking beer, wine, and mixed drinks (alcohol).  · Avoid exposure to chemicals.  · Quit smoking and avoid secondhand smoke. If you have problems quitting, ask for help.  · See your doctor regularly and seek treatment for illnesses like high blood cholesterol, high blood sugar, or high blood pressure.  · Keep a healthy weight or lose weight if needed.  When do I need to call the doctor?   Activate the emergency medical system right away if you have signs of a heart attack or stroke. Call 911 in the [place] or [place]. The sooner treatment begins, the better your chances for recovery. Call for emergency help right away if you have:  · Signs of heart attack:  ? Chest pain  ? Trouble breathing  ? Fast heartbeat  ? Feeling dizzy  · Signs of stroke:  ? Sudden numbness or weakness of the face, arm, or leg, especially on one side of the body  ? Sudden confusion, trouble speaking or understanding  ? Sudden trouble seeing in one or both eyes  ? Sudden trouble walking, dizziness, loss of balance or coordination  ? Sudden severe headache with no known  cause  Call your doctor if you have:  · Signs of infection. These include a fever of 100.4°F (38°C) or higher, chills, or a wound that will not heal.  · Pale or blue color of the arm or leg  · Numbness of arm or leg that does not go away  · Pain that does not go away, even at rest  · Very bad headache  Teach Back: Helping You Understand   The Teach Back Method helps you understand the information we are giving you. After talking with the staff, tell them in your own words what you were just told. This helps to make sure the staff has covered each thing clearly. It also helps to explain things that may have been a bit confusing. Before going home, make sure you are able to do these:  · I can tell you about my condition.  · I can tell you how I will take extra care when walking and to avoid falling.  · I can tell you what I will do if I have signs of a heart attack or stroke.  · I can tell you what I will do if I have pain behind my calf or swelling, soreness, or redness in my leg.  Where can I learn more?   National [place] of [employer] Disorders and Stroke  https://www.ninds.nih.gov/Disorders/All-Disorders/Pnoynggrukb-Arlsqkhdpsx-Pupp   Last Reviewed Date   2020-03-23  Consumer Information Use and Disclaimer   This information is not specific medical advice and does not replace information you receive from your health care provider. This is only a brief summary of general information. It does NOT include all information about conditions, illnesses, injuries, tests, procedures, treatments, therapies, discharge instructions or life-style choices that may apply to you. You must talk with your health care provider for complete information about your health and treatment options. This information should not be used to decide whether or not to accept your health care providers advice, instructions or recommendations. Only your health care provider has the knowledge and training to provide advice that is right for  you.  Copyright   Copyright © 2021 [employer], [employer]. and its affiliates and/or licensors. All rights reserved.  Patient Education       Peripheral Neuropathy Discharge Instructions   About this topic   Your nerves carry information to and from the brain. They also carry signals to and from the spinal cord. You have many nerves outside of your spinal cord. They are all a part of your peripheral nervous system. They work with your brain and spinal cord. All of these parts give your body information about senses, moving, and the environment. Damage to any of the nerves outside of your brain or spinal cord is peripheral neuropathy. What you feel and where it is will depend on what nerves are affected.  What care is needed at home?   · Ask your doctor what you need to do when you go home. Make sure you ask questions if you do not understand what the doctor says. This way you will know what you need to do.  · Take your drugs as ordered by your doctor.  · Wear braces or splints to keep pressure off the nerves if you doctor suggests you wear them.  · Use a cane, walker, or a wheelchair to help you get around safely if you are having balance problems or trouble walking.  · Wear compression sleeves or stockings if your doctor suggests you wear them.  · Do daily checks on your skin and any parts that have less feeling in them. Check the bottom of your feet daily. Use a mirror to see all of your foot.  · If you have decreased feeling in your feet, ask your doctor about proper footwear. Never go outside without shoes. Wear shoes at the beach and around the pool.  What follow-up care is needed?   Your doctor may ask you to make visits to the office to check on your progress. Be sure to keep these visits. Your doctor may send you to a physical therapist (PT) for care to lessen your pain and to learn exercises. Your doctor may send you to some other doctor, called a neurologist, who specializes in nerve problems.  What drugs may be  needed?   The doctor may order drugs to:  · Control blood sugar  · Help with pain  · Suppress the immune system  · Help with eating, bathroom, or sex problems  Will physical activity be limited?   Physical activities may be limited due to problems from the peripheral neuropathy. Talk to your doctor about the right amount of activity for you.  What problems could happen?   · Long-term pain or nerve damage  · Sores on the feet  · Loss of balance, trouble walking, and a higher risk of falling  · Damage in the peripheral nerves affects the functions that control your blood flow and heartbeat  What can be done to prevent this health problem?   · Control high blood sugar.  · Limit alcohol use.  · If you are a smoker, quit. Smoking lessens the blood supply to peripheral nerves.  · If you have a vitamin deficiency, talk to your doctor to see if you need to add any vitamins to your diet.  · Keep a healthy weight. If you are overweight, lose weight.  · Avoid toxic chemicals, pesticides, and other toxins.  When do I need to call the doctor?   · Signs of infection. These include a fever of 100.4°F (38°C) or higher, chills, or a wound that will not heal.  · New sores or signs of wound infection. These include swelling, redness, warmth around the wound; too much pain when touched; yellowish, greenish, or bloody discharge; foul smell coming from the wound.  · Numbness on the foot or legs  · Blood sugar is lower or higher than normal  · Trouble breathing  · Chest pain  · Dizziness or lightheadedness  Teach Back: Helping You Understand   The Teach Back Method helps you understand the information we are giving you. After you talk with the staff, tell them in your own words what you learned. This helps to make sure the staff has described each thing clearly. It also helps to explain things that may have been confusing. Before going home, make sure you can do these:  · I can tell you about my condition.  · I can tell you what may help  "me stay safe when moving about.  · I can tell you what I will do if I have numbness in my feet or legs, trouble breathing, chest pain, or feel dizzy.  Where can I learn more?   American Cancer Society  https://www.cancer.org/treatment/treatments-and-side-effects/physical-side-effects/peripheral-neuropathy/what-is-peripherial-neuropathy.html   National [place] of Neurological Disorders and Stroke  http://www.ninds.nih.gov/disorders/peripheralneuropathy/detail_peripheralneuropathy.htm   Last Reviewed Date   2020-10-12  Consumer Information Use and Disclaimer   This information is not specific medical advice and does not replace information you receive from your health care provider. This is only a brief summary of general information. It does NOT include all information about conditions, illnesses, injuries, tests, procedures, treatments, therapies, discharge instructions or life-style choices that may apply to you. You must talk with your health care provider for complete information about your health and treatment options. This information should not be used to decide whether or not to accept your health care providers advice, instructions or recommendations. Only your health care provider has the knowledge and training to provide advice that is right for you.  Copyright   Copyright © 2021 UpToDate, Inc. and its affiliates and/or licensors. All rights reserved.  Patient Education       Carpal Tunnel Syndrome   The Basics   Written by the doctors and editors at [facility]   What is carpal tunnel syndrome? -- Carpal tunnel syndrome is a condition that causes pain, numbness, and sometimes weakness in the fingers and hands. It happens when a nerve in the wrist called the "median nerve" gets pinched or squeezed.  The median nerve goes through a tunnel in the wrist. This tunnel is formed by the bones of the wrist and a tough band of tissue called a "ligament" (figure 1). Experts do not know exactly how the nerve can get " pinched. But they think it might happen when:  · Tendons that go through the same tunnel get swollen (tendons are bands of tissue that connect muscles to bones)  · Tissues surrounding the tendons harden or get swollen  · People use their hands for work that involves repetitive or forceful movements  The median nerve carries signals about sensation from the hand to the brain. Then it sends signals from the brain to the muscles. In other words, it helps tell the brain what the hand is feeling and makes the muscles of the hand move. The nerve is connected to these parts of the hand:  · Thumb  · Index finger  · Middle finger  · Parts of the ring finger  · Parts of the palm closest to the thumb  Women are more likely than men to get carpal tunnel syndrome. Being overweight probably increases the risk of carpal tunnel syndrome. Certain health conditions also might increase the risk, including diabetes and rheumatoid arthritis. Women who are pregnant are also more likely to get carpal tunnel syndrome, but it usually goes away after the baby is born.  What are the symptoms of carpal tunnel syndrome? -- The symptoms include pain and tingling in the thumb and the index, middle, and ring fingers (figure 1). Symptoms are typically worst at night and can wake you up from sleep. Often the symptoms affect both hands, but one hand might have worse symptoms than the other.  In some cases, the muscles of the hand, thumb, or fingers can be weak or feel clumsy. In other cases, pain and tingling can extend to the whole hand or even up to the wrist and forearm. Rarely, pain and tingling extends past the elbow to the shoulder.  The symptoms can also flare up when you do things that involve bending and unbending your wrist or raising your arms. Some activities can trigger symptoms in people with carpal tunnel syndrome. But they do not actually cause the condition. Examples include:  · Driving  · Reading  · Typing  · Holding a  "phone  · Sleeping in certain positions  In many people, symptoms come and go. But some people eventually have symptoms all the time. They can end up having trouble moving their fingers or controlling their .  Is there a test for carpal tunnel syndrome? -- Yes. Electrical tests of the nerves can show if you have carpal tunnel syndrome, but these tests are not always necessary.  Your doctor will probably be able to tell if you have carpal tunnel syndrome by learning about your symptoms and doing an exam. During the exam, they might tap on or press on your wrist, or ask you to hold your hands in ways that are known to make symptoms worse.  Your doctor might also order electrical nerve tests. These tests can confirm that you have carpal tunnel syndrome. They include:  · Nerve conduction studies - Nerve conduction studies can show whether the median nerve is carrying electrical signals the right way. In people with carpal tunnel syndrome, signals can be slow or weak.  · Electromyography - Electromyography, also called EMG, can show whether the muscles in the hand and wrist are responding the right way to electrical signals. This test is most useful in figuring out if symptoms are related to carpal tunnel syndrome or another problem.  Should I see a doctor or nurse? -- Yes. See your doctor or nurse if you have the symptoms described above, and they bother you.  How is carpal tunnel syndrome treated? -- Treatments are often combined and can include:  · Wrist splints - Some people feel better if they wear splints at night that keep their hands in a "neutral position." The neutral position is when the wrist is not bent forward or backward and the fingers are curled naturally toward the palm.  Doctors often suggest splints for women who get carpal tunnel syndrome during pregnancy. They usually don't need other treatments, since in most cases, symptoms improve after the baby is born.  · Steroid shots or pills - Steroids " "are a group of medicines that control inflammation and swelling. To treat carpal tunnel syndrome, doctors sometimes inject steroids into the carpal tunnel. People who do not want to get a shot can take steroids in pill form instead. But the pills are less effective than the shot.  · Other physical treatments - Some people find that yoga helps with their symptoms. There is also weak evidence that something called "ultrasound therapy" might help in some cases. This involves using sound waves to try to treat symptoms. Other treatments such as "nerve gliding" and "carpal bone mobilization" are also sometimes helpful. These treatments are done by a physical or occupational therapist and involve moving the bones in your wrist around in a special way.  · Surgery - Doctors offer surgery to people who have ongoing or severe nerve damage that is causing the symptoms of carpal tunnel syndrome. Surgery for carpal tunnel syndrome involves cutting the ligament that stretches across the wrist to form the tunnel.  Can carpal tunnel syndrome be prevented? -- It's unclear whether there is any way to prevent carpal tunnel syndrome. People sometimes think that the condition happens because they use a computer too much. But studies have shown that computer use is probably not related to carpal tunnel syndrome.  All topics are updated as new evidence becomes available and our peer review process is complete.  This topic retrieved from [employer] on: Sep 21, 2021.  Topic 34566 Version 9.0  Release: 29.4.2 - C29.263  © 2021 UpToDate, Inc. and/or its affiliates. All rights reserved.  figure 1: Carpal tunnel syndrome     The carpal tunnel is a tunnel in the wrist that is formed by the bones of the wrist and a tough band of tissue called a "ligament." Carpal tunnel syndrome happens when a nerve that goes through that tunnel, called the "median nerve," gets pinched or squeezed. Carpal tunnel syndrome causes pain and numbness most often in the " areas shaded here in blue.  Graphic 10975 Version 2.0    Consumer Information Use and Disclaimer   This information is not specific medical advice and does not replace information you receive from your health care provider. This is only a brief summary of general information. It does NOT include all information about conditions, illnesses, injuries, tests, procedures, treatments, therapies, discharge instructions or life-style choices that may apply to you. You must talk with your health care provider for complete information about your health and treatment options. This information should not be used to decide whether or not to accept your health care provider's advice, instructions or recommendations. Only your health care provider has the knowledge and training to provide advice that is right for you. The use of this information is governed by the [employer] End User License Agreement, available at https://www.[employer]/en/solutions/InfoNow/about/catina.The use of [employer] content is governed by the [employer] Terms of Use. ©2021 UpToDate, Inc. All rights reserved.  Copyright   © 2021 UpToDate, Inc. and/or its affiliates. All rights reserved.    1. MRI of the brain with and without contrast to evaluate headaches  2. EMG NCS all 4 extremities with Dr. AGUSTINA Bean to evaluate neuropathy  3. Referral for sleep study to evaluate MARY symptoms  4. Headache diary  5. Labs: PPN panel  6. Follow up with eye doctor for routine eye exam  7. Renew and increase cymbalta 30 mg one twice a day  8. Renew naproxen 500 mg one tablet twice a day with food as needed for headache, no more than two in a day or two days in a week  9. Rx phenergan 12.5 mg one tablet every 8 hours as needed for headache, no more than two in a day or two days in a week, no driving when taking this medication, discussed side effects  10. Follow up with neurology in 3 months or sooner if needed

## 2022-02-16 NOTE — PROGRESS NOTES
Subjective:       Patient ID: Kaylyn Silva is a 56 y.o. female.    Chief Complaint:  No chief complaint on file.      History of Present Illness  This pleasant 56 year old right handed  female presents to the clinic as new patient referral from [facility] for numbness in all 4 extremities and headaches. Patient states headaches started in 2017 and reports 2 to 3 headaches per month that she rates as a 5 to 8 on a scale of 0 to 10. She denies a daily headache. Headaches are unilateral in the left temporal area with aching and throbbing pain that decreases he ADL's. Headache triggers are unknown and she denies any aura. Headaches have a sudden onset and can last from 30 minutes to an hour. She denies any nausea or vomiting and denies any photophobia or phonophobia. Resting makes the headaches better and she is unsure what makes the headaches worse. She last seen her eye doctor 2 years ago and was encouraged to get a routine eye exam. She denies any neck injury but did have a head injury in 2017 to the left temporal area. She takes ibuprofen as needed for the headaches 2 or 3 times a month. She is not on any preventive or abortive therapy. She does report symptoms of MARY with snoring, fatigue and some morning headaches but has not been tested for MARY. Discussed MARY in detail and she desires to have the sleep study. She has not had any imaging of the brain to evaluate the headaches. Family history is negative for headaches and is positive for ovarian cancer, HTN, hypothyroidism, DM, TIA, and enlarged heart. PMH includes ovarian cancer 2017, bronchitis, depression, kidney stones and arthritis. She states she had 4 chemotherapy treatments for the ovarian cancer. She denies smoking or drinking alcohol and states she had a total hysterectomy in 2017.     Patient reports burning, numbness, and tingling in the bilateral hands and feet that is worse in the feet. She states the symptoms started in 2017  after her chemotherapy treatments. She rates these symptoms as a 5 on a scale of 0 to 5 that is constant. She reports aching and shooting pains that she rates as a 7 on a scale of 0 to 10. She denies diabetes, denies neck injury and denies any back injury. She does report bilateral hand weakness. Phalen and Tinel sign positive on exam. She reports chronic back pain in the lumbar area that started in 2017 and that she rates as a 7 on a scale of 0 to 10. She reports back pain radiates to the legs and is intermittent. She denies any neck pain. She has Zanaflex 4 mg for her muscle spasms and back pain. She is tolerating this medication without side effects. She also has naproxen 500 mg as needed for the pain. She states she completed physical therapy in January 2022 and it did help some. She has not had an EMG NCS done in the past. Discussed the plan in detail with Neurologist Dr. AGUSTINA Bean and the patient and they are in agreement with the plan. All her questions were answered at today's visit.          Review of Systems  Review of Systems   Constitutional: Negative for activity change, diaphoresis, fever and unexpected weight change.   HENT: Negative for congestion, ear pain, facial swelling, hearing loss, tinnitus, trouble swallowing and voice change.    Eyes: Negative for photophobia, pain and visual disturbance.   Respiratory: Negative for chest tightness, shortness of breath and wheezing.    Cardiovascular: Negative for chest pain, palpitations and leg swelling.   Gastrointestinal: Negative for constipation, diarrhea, nausea and vomiting.   Genitourinary: Negative for difficulty urinating.   Musculoskeletal: Negative for back pain, gait problem, neck pain and neck stiffness.   Skin: Negative for color change, pallor, rash and wound.   Neurological: Positive for numbness and headaches. Negative for dizziness, tremors, seizures, syncope, facial asymmetry, speech difficulty, weakness and light-headedness.    Psychiatric/Behavioral: Negative for agitation, behavioral problems, confusion, decreased concentration and hallucinations. The patient is not nervous/anxious and is not hyperactive.        Objective:      Neurologic Exam     Mental Status   Oriented to person, place, and time.   Oriented to person.   Oriented to place.   Oriented to time.   Attention: normal. Concentration: normal.   Speech: speech is normal   Level of consciousness: alert  Knowledge: good.     Cranial Nerves     CN II   Visual fields full to confrontation.     CN III, IV, VI   Pupils are equal, round, and reactive to light.  Extraocular motions are normal.   Right pupil: Size: 3 mm. Shape: regular. Reactivity: brisk.   Left pupil: Size: 3 mm. Shape: regular. Reactivity: brisk.   CN III: no CN III palsy  CN VI: no CN VI palsy    CN V   Facial sensation intact.     CN VII   Facial expression full, symmetric.     CN VIII   CN VIII normal.   Hearing: intact    CN IX, X   CN IX normal.   CN X normal.     CN XI   CN XI normal.     CN XII   CN XII normal.     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   Vibration normal.   Proprioception normal.   Pinprick normal.     Tinel's sign positive bilaterally  Phalen's sign positive left hand     Gait, Coordination, and Reflexes     Gait  Gait: normal    Coordination   Romberg: positive  Finger to nose coordination: normal    Tremor   Resting tremor: absent  Intention tremor: absent  Action tremor: absent    Reflexes   Right brachioradialis: 2+  Left brachioradialis: 2+  Right biceps: 2+  Left biceps: 2+  Right triceps: 2+  Left triceps: 2+  Right patellar:  2+  Left patellar: 1+  Right achilles: 2+  Left achilles: 2+  Right : 4+  Left : 4+  Right Camacho: absent  Left Camacho: absent         Physical Exam  Constitutional:       General: She is not in acute distress.  HENT:      Head: Normocephalic.      Nose: Nose normal.      Mouth/Throat:      Mouth: Mucous membranes are moist.   Eyes:      Extraocular Movements: Extraocular movements intact and EOM normal.      Pupils: Pupils are equal, round, and reactive to light.   Cardiovascular:      Rate and Rhythm: Normal rate and regular rhythm.      Heart sounds: Normal heart sounds. No murmur heard.      Pulmonary:      Effort: Pulmonary effort is normal. No respiratory distress.      Breath sounds: Normal breath sounds. No wheezing, rhonchi or rales.   Musculoskeletal:         General: No swelling, tenderness, deformity or signs of injury. Normal range of motion.      Cervical back: Normal range of motion. No rigidity or tenderness.      Right lower leg: No edema.      Left lower leg: No edema.   Skin:     General: Skin is warm and dry.      Capillary Refill: Capillary refill takes less than 2 seconds.      Coloration: Skin is not jaundiced or pale.      Findings: No bruising, erythema, lesion or rash.   Neurological:      Mental Status: She is alert and oriented to person, place, and time.      Coordination: Romberg Test abnormal. Finger-Nose-Finger Test normal.      Gait: Gait is intact.      Deep Tendon Reflexes:      Reflex Scores:       Tricep reflexes are 2+ on the right side and 2+ on the left side.       Bicep reflexes are 2+ on the right side and 2+ on the left side.       Brachioradialis reflexes are 2+ on the right side and 2+ on the left side.       Patellar reflexes are 2+ on the right side and 1+ on the left side.       Achilles reflexes are 2+ on the right side and 2+ on the left side.  Psychiatric:         Mood and Affect: Mood normal.         Speech: Speech normal.         Behavior: Behavior  normal.           Assessment:     Problem List Items Addressed This Visit        Neuro    Nonintractable headache - Primary    Relevant Medications    naproxen (NAPROSYN) 500 MG tablet    promethazine (PHENERGAN) 12.5 MG Tab    Idiopathic peripheral neuropathy    Relevant Medications    DULoxetine (CYMBALTA) 30 MG capsule    Other Relevant Orders    Vitamin B12 & Folate    CBC Auto Differential (Completed)    Comprehensive Metabolic Panel    Protein Electrophoresis, Serum with Reflex LUCRETIA    Sedimentation Rate    TEE EIA w/ Reflex to dsDNA/MARCIAL    Syphilis Antibody with reflex to RPR    Vitamin D    Bilateral carpal tunnel syndrome       Other    Sleep apnea    Relevant Orders    Ambulatory referral/consult to Sleep Disorders            Plan:       1. MRI of the brain with and without contrast to evaluate headaches  2. EMG NCS all 4 extremities with Dr. AGUSTINA Bean to evaluate neuropathy  3. Referral for sleep study to evaluate MARY symptoms  4. Headache diary  5. Labs: PPN panel  6. Follow up with eye doctor for routine eye exam  7. Renew and increase cymbalta 30 mg one twice a day  8. Renew naproxen 500 mg one tablet twice a day with food as needed for headache, no more than two in a day or two days in a week  9. Rx phenergan 12.5 mg one tablet every 8 hours as needed for headache, no more than two in a day or two days in a week, no driving when taking this medication, discussed side effects  10. Follow up with neurology in 3 months or sooner if needed

## 2022-02-17 ENCOUNTER — TELEPHONE (OUTPATIENT)
Dept: NEUROLOGY | Facility: CLINIC | Age: 57
End: 2022-02-17
Payer: COMMERCIAL

## 2022-02-17 DIAGNOSIS — E55.9 VITAMIN D DEFICIENCY: Primary | ICD-10-CM

## 2022-02-17 RX ORDER — ERGOCALCIFEROL 1.25 MG/1
CAPSULE ORAL
Qty: 12 CAPSULE | Refills: 1 | Status: SHIPPED | OUTPATIENT
Start: 2022-02-17 | End: 2022-05-16

## 2022-02-17 NOTE — TELEPHONE ENCOUNTER
Pt voiced understanding  ----- Message from Brendon Yu NP sent at 2/17/2022  1:01 PM CST -----  Please let patient know her sed rate slightly elevated, alk phos is still elevated by not quite as bad as 11 months ago, vit d is real low and I have sent her in some to her pharmacy with instructions on the bottle. We are still waiting on the maricruz and spep. Thanks.

## 2022-03-07 ENCOUNTER — TELEPHONE (OUTPATIENT)
Dept: NEUROLOGY | Facility: CLINIC | Age: 57
End: 2022-03-07
Payer: COMMERCIAL

## 2022-03-07 NOTE — TELEPHONE ENCOUNTER
Pt voiced understanding.  ----- Message from Brendon Yu NP sent at 3/4/2022  1:53 PM CST -----  Please let patient know that her MRI was reviewed and showed There are a few scattered nonspecific white matter foci.  These are nonspecific.  They can be incidental, related to migraine headache syndrome or vasculitis, or due to chronic small vessel ischemia. Dr. Bean reviewed the films and feels this is due to headaches, thanks

## 2022-03-31 DIAGNOSIS — G47.30 SLEEP APNEA, UNSPECIFIED: Primary | ICD-10-CM

## 2022-05-06 DIAGNOSIS — Z12.11 SCREENING FOR MALIGNANT NEOPLASM OF COLON: Primary | ICD-10-CM

## 2022-05-16 DIAGNOSIS — E55.9 VITAMIN D DEFICIENCY: ICD-10-CM

## 2022-05-16 RX ORDER — ERGOCALCIFEROL 1.25 MG/1
CAPSULE ORAL
Qty: 12 CAPSULE | Refills: 1 | Status: SHIPPED | OUTPATIENT
Start: 2022-05-16

## 2022-05-19 NOTE — PROGRESS NOTES
Subjective:       Patient ID: Kaylyn Silva is a 57 y.o. female.    Chief Complaint:  No chief complaint on file.      History of Present Illness  This pleasant 57 year old right handed  female presents to the clinic for follow up of numbness in all 4 extremities and headaches. Patient states she is pleased with the number and treatment of headaches. She desires to continue the current management of the headaches. Patient states headaches started in 2017 and reports 2 to 3 headaches per month that she rates as a 5 to 8 on a scale of 0 to 10. She denies a daily headache. Headaches are unilateral in the left temporal area with aching and throbbing pain that decreases he ADL's. Headache triggers include stress and cigarette smoke but are otherwise unknown and she denies any aura. Discussed avoiding the headache triggers. Headaches have a sudden onset and can last from 30 minutes to an hour. She denies any nausea or vomiting and denies any photophobia or phonophobia. Resting makes the headaches better and she is unsure what makes the headaches worse. She last seen her eye doctor 2 years ago and was encouraged to get a routine eye exam. She denies any neck injury but did have a head injury in 2017 to the left temporal area. She takes ibuprofen as needed for the headaches 2 or 3 times a month. She is not on any preventive and has naproxen and phenergan for abortive therapy. She is tolerating these medications without side effects and states they do relieve the headaches. She does report symptoms of MARY with snoring, fatigue and some morning headaches. She is scheduled for a sleep study on June 6, 2022 and was encouraged to keep this appointment.  Family history is negative for headaches and is positive for ovarian cancer, HTN, hypothyroidism, DM, TIA, and enlarged heart. PMH includes ovarian cancer 2017, bronchitis, depression, kidney stones and arthritis. She states she had 4 chemotherapy treatments  for the ovarian cancer. She denies smoking or drinking alcohol and states she had a total hysterectomy in 2017.      Patient states the increase in the cymbalta 30 mg to twice a day is helping with her neuropathy symptoms. She is tolerating this medication without side effects. She also has gabapentin 300 mg one three times a day but most days only takes it one time a day. She is tolerating the gabapentin without side effects. She has not had the EMG NCS yet and will get this done. Patient reports burning, numbness, and tingling in the bilateral hands and feet that is worse in the feet. She states the symptoms started in 2017 after her chemotherapy treatments. She still rates these symptoms as a 5 on a scale of 0 to 5 that is constant despite increasing the cymbalta. She reports aching and shooting pains that she rates as a 7 on a scale of 0 to 10. She denies diabetes, denies neck injury and denies any back injury. She does report bilateral hand weakness. Phalen and Tinel sign positive on exam. She reports chronic back pain in the lumbar area that started in 2017 and that she rates as a 7 on a scale of 0 to 10. She reports back pain radiates to the legs and is intermittent. She denies any neck pain. She has Zanaflex 4 mg for her muscle spasms and back pain. She is tolerating this medication without side effects. She also has naproxen 500 mg as needed for the pain. She states she completed physical therapy in January 2022 and it did help some.  Discussed the plan in detail with Neurologist Dr. AGUSTINA Bean and the patient and they are in agreement with the plan. All her questions were answered at today's visit.       MRI of the brain with and without contrast done on March 3, 2022 showed There are a few scattered nonspecific white matter foci of abnormal signal.  These are nonspecific.  They can be incidental, related to migraine headache syndrome are vasculitis, or due to chronic small vessel ischemia.        Review of  Systems  Review of Systems   Constitutional: Negative for activity change, diaphoresis, fever and unexpected weight change.   HENT: Negative for congestion, ear pain, facial swelling, hearing loss, tinnitus, trouble swallowing and voice change.    Eyes: Negative for photophobia, pain and visual disturbance.   Respiratory: Negative for chest tightness, shortness of breath and wheezing.    Cardiovascular: Negative for chest pain, palpitations and leg swelling.   Gastrointestinal: Negative for constipation, diarrhea, nausea and vomiting.   Genitourinary: Negative for difficulty urinating.   Musculoskeletal: Negative for back pain, gait problem, neck pain and neck stiffness.   Skin: Negative for color change, pallor, rash and wound.   Neurological: Positive for numbness and headaches. Negative for dizziness, tremors, seizures, syncope, facial asymmetry, speech difficulty, weakness and light-headedness.   Psychiatric/Behavioral: Negative for agitation, behavioral problems, confusion, decreased concentration and hallucinations. The patient is not nervous/anxious and is not hyperactive.        Objective:      Neurologic Exam     Mental Status   Oriented to person, place, and time.   Oriented to person.   Oriented to place.   Oriented to time.   Attention: normal. Concentration: normal.   Speech: speech is normal   Level of consciousness: alert  Knowledge: good.     Cranial Nerves     CN II   Visual fields full to confrontation.     CN III, IV, VI   Pupils are equal, round, and reactive to light.  Extraocular motions are normal.   Right pupil: Size: 3 mm. Shape: regular. Reactivity: brisk.   Left pupil: Size: 3 mm. Shape: regular. Reactivity: brisk.   CN III: no CN III palsy  CN VI: no CN VI palsy    CN V   Facial sensation intact.     CN VII   Facial expression full, symmetric.     CN VIII   CN VIII normal.   Hearing: intact    CN IX, X   CN IX normal.   CN X normal.     CN XI   CN XI normal.     CN XII   CN XII normal.      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     Tinel's sign positive bilaterally  Phalen's sign positive left hand      Gait, Coordination, and Reflexes     Gait  Gait: normal    Coordination   Finger to nose coordination: normal    Tremor   Resting tremor: absent  Intention tremor: absent  Action tremor: absent    Reflexes   Right brachioradialis: 2+  Left brachioradialis: 2+  Right biceps: 2+  Left biceps: 2+  Right triceps: 2+  Left triceps: 2+  Right patellar: 2+  Left patellar: 2+  Right achilles: 2+  Left achilles: 2+  Right : 4+  Left : 4+      Physical Exam  Constitutional:       General: She is not in acute distress.  HENT:      Head: Normocephalic.      Nose: Nose normal.      Mouth/Throat:      Mouth: Mucous membranes are moist.   Eyes:      Extraocular Movements: Extraocular movements intact and EOM normal.      Pupils: Pupils are equal, round, and reactive to light.   Cardiovascular:      Rate and Rhythm: Normal rate and regular rhythm.      Heart sounds: Normal heart sounds. No murmur heard.  Pulmonary:      Effort: Pulmonary effort is normal. No respiratory distress.      Breath sounds: Normal breath sounds. No wheezing, rhonchi or rales.   Musculoskeletal:         General: No swelling, tenderness, deformity or signs of injury. Normal range of motion.      Cervical back: Normal range of motion. No rigidity or tenderness.      Right lower leg: No edema.      Left lower leg: No edema.   Skin:     General: Skin is warm and dry.      Capillary Refill: Capillary refill takes less than 2 seconds.      Coloration: Skin is not jaundiced or pale.       Findings: No bruising, erythema, lesion or rash.   Neurological:      Mental Status: She is alert and oriented to person, place, and time.      Coordination: Finger-Nose-Finger Test normal.      Gait: Gait is intact.      Deep Tendon Reflexes:      Reflex Scores:       Tricep reflexes are 2+ on the right side and 2+ on the left side.       Bicep reflexes are 2+ on the right side and 2+ on the left side.       Brachioradialis reflexes are 2+ on the right side and 2+ on the left side.       Patellar reflexes are 2+ on the right side and 2+ on the left side.       Achilles reflexes are 2+ on the right side and 2+ on the left side.  Psychiatric:         Mood and Affect: Mood normal.         Speech: Speech normal.         Behavior: Behavior normal.           Assessment:     Problem List Items Addressed This Visit        Neuro    Nonintractable headache - Primary    Relevant Medications    promethazine (PHENERGAN) 12.5 MG Tab    Idiopathic peripheral neuropathy    Relevant Medications    DULoxetine (CYMBALTA) 30 MG capsule    Other Relevant Orders    Ambulatory referral/consult to Neurology    Bilateral carpal tunnel syndrome    Relevant Medications    DULoxetine (CYMBALTA) 30 MG capsule    Other Relevant Orders    Ambulatory referral/consult to Neurology       Other    Sleep apnea            Plan:       1. Continue zanaflex and gabapentin per PCP  2. EMG NCS all 4 extremities with [facility] to evaluate neuropathy  3. Keep sleep study appointment on June 6, 2022  4. Avoid headache triggers  5. Continue vitamin d 50,000 IU one capsule monthly  6. Follow up with eye doctor for routine eye exam if not already done  7. Renew and continue cymbalta 30 mg one twice a day  8. Continue naproxen 500 mg one tablet twice a day with food as needed for headache, no more than two in a day or two days in a week  9. Renew and continue phenergan 12.5 mg one tablet every 8 hours as needed for headache, no more than two in a day or two days in a  week, no driving when taking this medication  10. Wear left wrist splint at night and if it helps then wear wrist splint on the right  11. Follow up with neurology in 3 months or sooner if needed

## 2022-05-23 ENCOUNTER — OFFICE VISIT (OUTPATIENT)
Dept: NEUROLOGY | Facility: CLINIC | Age: 57
End: 2022-05-23
Payer: COMMERCIAL

## 2022-05-23 VITALS
DIASTOLIC BLOOD PRESSURE: 80 MMHG | HEART RATE: 80 BPM | BODY MASS INDEX: 30.97 KG/M2 | OXYGEN SATURATION: 98 % | HEIGHT: 67 IN | WEIGHT: 197.31 LBS | SYSTOLIC BLOOD PRESSURE: 126 MMHG

## 2022-05-23 DIAGNOSIS — R51.9 NONINTRACTABLE HEADACHE, UNSPECIFIED CHRONICITY PATTERN, UNSPECIFIED HEADACHE TYPE: Primary | ICD-10-CM

## 2022-05-23 DIAGNOSIS — G56.03 BILATERAL CARPAL TUNNEL SYNDROME: ICD-10-CM

## 2022-05-23 DIAGNOSIS — G60.9 IDIOPATHIC PERIPHERAL NEUROPATHY: ICD-10-CM

## 2022-05-23 DIAGNOSIS — G47.30 SLEEP APNEA, UNSPECIFIED TYPE: ICD-10-CM

## 2022-05-23 PROBLEM — C55 UTERINE CANCER: Status: ACTIVE | Noted: 2022-05-23

## 2022-05-23 PROBLEM — E66.9 OBESITY: Status: ACTIVE | Noted: 2022-05-23

## 2022-05-23 PROBLEM — I10 ESSENTIAL HYPERTENSION: Status: ACTIVE | Noted: 2022-05-23

## 2022-05-23 PROBLEM — F32.A DEPRESSED: Status: ACTIVE | Noted: 2022-05-23

## 2022-05-23 PROBLEM — M79.2 NEUROPATHIC PAIN: Status: ACTIVE | Noted: 2022-05-23

## 2022-05-23 PROCEDURE — 3074F PR MOST RECENT SYSTOLIC BLOOD PRESSURE < 130 MM HG: ICD-10-PCS | Mod: CPTII,,, | Performed by: NURSE PRACTITIONER

## 2022-05-23 PROCEDURE — 1160F PR REVIEW ALL MEDS BY PRESCRIBER/CLIN PHARMACIST DOCUMENTED: ICD-10-PCS | Mod: CPTII,,, | Performed by: NURSE PRACTITIONER

## 2022-05-23 PROCEDURE — 99213 PR OFFICE/OUTPT VISIT, EST, LEVL III, 20-29 MIN: ICD-10-PCS | Mod: S$PBB,,, | Performed by: NURSE PRACTITIONER

## 2022-05-23 PROCEDURE — 1159F PR MEDICATION LIST DOCUMENTED IN MEDICAL RECORD: ICD-10-PCS | Mod: CPTII,,, | Performed by: NURSE PRACTITIONER

## 2022-05-23 PROCEDURE — 3074F SYST BP LT 130 MM HG: CPT | Mod: CPTII,,, | Performed by: NURSE PRACTITIONER

## 2022-05-23 PROCEDURE — 99213 OFFICE O/P EST LOW 20 MIN: CPT | Mod: S$PBB,,, | Performed by: NURSE PRACTITIONER

## 2022-05-23 PROCEDURE — 3008F BODY MASS INDEX DOCD: CPT | Mod: CPTII,,, | Performed by: NURSE PRACTITIONER

## 2022-05-23 PROCEDURE — 99214 OFFICE O/P EST MOD 30 MIN: CPT | Mod: PBBFAC | Performed by: NURSE PRACTITIONER

## 2022-05-23 PROCEDURE — 3008F PR BODY MASS INDEX (BMI) DOCUMENTED: ICD-10-PCS | Mod: CPTII,,, | Performed by: NURSE PRACTITIONER

## 2022-05-23 PROCEDURE — 3079F DIAST BP 80-89 MM HG: CPT | Mod: CPTII,,, | Performed by: NURSE PRACTITIONER

## 2022-05-23 PROCEDURE — 1159F MED LIST DOCD IN RCRD: CPT | Mod: CPTII,,, | Performed by: NURSE PRACTITIONER

## 2022-05-23 PROCEDURE — 1160F RVW MEDS BY RX/DR IN RCRD: CPT | Mod: CPTII,,, | Performed by: NURSE PRACTITIONER

## 2022-05-23 PROCEDURE — 3079F PR MOST RECENT DIASTOLIC BLOOD PRESSURE 80-89 MM HG: ICD-10-PCS | Mod: CPTII,,, | Performed by: NURSE PRACTITIONER

## 2022-05-23 RX ORDER — DULOXETIN HYDROCHLORIDE 30 MG/1
CAPSULE, DELAYED RELEASE ORAL
Qty: 60 CAPSULE | Refills: 3 | Status: SHIPPED | OUTPATIENT
Start: 2022-05-23 | End: 2022-08-30 | Stop reason: SDUPTHER

## 2022-05-23 RX ORDER — PROMETHAZINE HYDROCHLORIDE 12.5 MG/1
TABLET ORAL
Qty: 30 TABLET | Refills: 3 | Status: SHIPPED | OUTPATIENT
Start: 2022-05-23 | End: 2022-08-30 | Stop reason: SDUPTHER

## 2022-05-23 NOTE — PATIENT INSTRUCTIONS
1. Continue zanaflex and gabapentin per PCP  2. EMG NCS all 4 extremities with [facility] to evaluate neuropathy  3. Keep sleep study appointment on June 6, 2022  4. Avoid headache triggers  5. Continue vitamin d 50,000 IU one capsule monthly  6. Follow up with eye doctor for routine eye exam if not already done  7. Renew and continue cymbalta 30 mg one twice a day  8. Continue naproxen 500 mg one tablet twice a day with food as needed for headache, no more than two in a day or two days in a week  9. Renew and continue phenergan 12.5 mg one tablet every 8 hours as needed for headache, no more than two in a day or two days in a week, no driving when taking this medication  10. Wear left wrist splint at night and if it helps then wear wrist splint on the right  11. Follow up with neurology in 3 months or sooner if needed

## 2022-06-06 ENCOUNTER — PROCEDURE VISIT (OUTPATIENT)
Dept: SLEEP MEDICINE | Facility: HOSPITAL | Age: 57
End: 2022-06-06
Attending: INTERNAL MEDICINE
Payer: COMMERCIAL

## 2022-06-06 DIAGNOSIS — G47.30 SLEEP APNEA, UNSPECIFIED: ICD-10-CM

## 2022-06-06 PROCEDURE — 95810 POLYSOM 6/> YRS 4/> PARAM: CPT | Mod: PO

## 2022-06-27 ENCOUNTER — HOSPITAL ENCOUNTER (OUTPATIENT)
Dept: GASTROENTEROLOGY | Facility: HOSPITAL | Age: 57
Discharge: HOME | End: 2022-06-27
Attending: STUDENT IN AN ORGANIZED HEALTH CARE EDUCATION/TRAINING PROGRAM
Payer: COMMERCIAL

## 2022-06-27 ENCOUNTER — ANESTHESIA (OUTPATIENT)
Dept: GASTROENTEROLOGY | Facility: HOSPITAL | Age: 57
End: 2022-06-27
Payer: COMMERCIAL

## 2022-06-27 ENCOUNTER — ANESTHESIA EVENT (OUTPATIENT)
Dept: GASTROENTEROLOGY | Facility: HOSPITAL | Age: 57
End: 2022-06-27
Payer: COMMERCIAL

## 2022-06-27 VITALS
SYSTOLIC BLOOD PRESSURE: 150 MMHG | TEMPERATURE: 98 F | HEIGHT: 67 IN | DIASTOLIC BLOOD PRESSURE: 83 MMHG | WEIGHT: 197 LBS | BODY MASS INDEX: 30.92 KG/M2 | HEART RATE: 70 BPM | OXYGEN SATURATION: 100 % | RESPIRATION RATE: 16 BRPM

## 2022-06-27 DIAGNOSIS — Z12.11 SCREENING FOR MALIGNANT NEOPLASM OF COLON: ICD-10-CM

## 2022-06-27 PROCEDURE — G0121 COLON CA SCRN NOT HI RSK IND: HCPCS

## 2022-06-27 PROCEDURE — 27201423 OPTIME MED/SURG SUP & DEVICES STERILE SUPPLY

## 2022-06-27 PROCEDURE — 63600175 PHARM REV CODE 636 W HCPCS: Performed by: NURSE ANESTHETIST, CERTIFIED REGISTERED

## 2022-06-27 PROCEDURE — G0121 COLON CA SCRN NOT HI RSK IND: ICD-10-PCS | Mod: ,,, | Performed by: STUDENT IN AN ORGANIZED HEALTH CARE EDUCATION/TRAINING PROGRAM

## 2022-06-27 PROCEDURE — G0121 COLON CA SCRN NOT HI RSK IND: HCPCS | Mod: ,,, | Performed by: STUDENT IN AN ORGANIZED HEALTH CARE EDUCATION/TRAINING PROGRAM

## 2022-06-27 PROCEDURE — 25000003 PHARM REV CODE 250: Performed by: NURSE ANESTHETIST, CERTIFIED REGISTERED

## 2022-06-27 PROCEDURE — 37000008 HC ANESTHESIA 1ST 15 MINUTES

## 2022-06-27 PROCEDURE — D9220A PRA ANESTHESIA: Mod: ,,, | Performed by: NURSE ANESTHETIST, CERTIFIED REGISTERED

## 2022-06-27 PROCEDURE — 37000009 HC ANESTHESIA EA ADD 15 MINS

## 2022-06-27 PROCEDURE — D9220A PRA ANESTHESIA: ICD-10-PCS | Mod: ,,, | Performed by: NURSE ANESTHETIST, CERTIFIED REGISTERED

## 2022-06-27 RX ORDER — SODIUM CHLORIDE 0.9 % (FLUSH) 0.9 %
10 SYRINGE (ML) INJECTION
Status: CANCELLED | OUTPATIENT
Start: 2022-06-27

## 2022-06-27 RX ORDER — PROPOFOL 10 MG/ML
VIAL (ML) INTRAVENOUS
Status: DISCONTINUED | OUTPATIENT
Start: 2022-06-27 | End: 2022-06-27

## 2022-06-27 RX ORDER — LIDOCAINE HYDROCHLORIDE 20 MG/ML
INJECTION, SOLUTION EPIDURAL; INFILTRATION; INTRACAUDAL; PERINEURAL
Status: DISCONTINUED | OUTPATIENT
Start: 2022-06-27 | End: 2022-06-27

## 2022-06-27 RX ORDER — SODIUM CHLORIDE 9 MG/ML
INJECTION, SOLUTION INTRAVENOUS CONTINUOUS
Status: CANCELLED | OUTPATIENT
Start: 2022-06-27

## 2022-06-27 RX ORDER — PROCHLORPERAZINE EDISYLATE 5 MG/ML
5 INJECTION INTRAMUSCULAR; INTRAVENOUS ONCE AS NEEDED
Status: CANCELLED | OUTPATIENT
Start: 2022-06-27 | End: 2033-11-22

## 2022-06-27 RX ORDER — ONDANSETRON 2 MG/ML
4 INJECTION INTRAMUSCULAR; INTRAVENOUS ONCE AS NEEDED
Status: CANCELLED | OUTPATIENT
Start: 2022-06-27 | End: 2033-11-22

## 2022-06-27 RX ADMIN — PROPOFOL 50 MG: 10 INJECTION, EMULSION INTRAVENOUS at 09:06

## 2022-06-27 RX ADMIN — SODIUM CHLORIDE: 9 INJECTION, SOLUTION INTRAVENOUS at 08:06

## 2022-06-27 RX ADMIN — PROPOFOL 100 MG: 10 INJECTION, EMULSION INTRAVENOUS at 09:06

## 2022-06-27 RX ADMIN — LIDOCAINE HYDROCHLORIDE 50 MG: 20 INJECTION, SOLUTION EPIDURAL; INFILTRATION; INTRACAUDAL; PERINEURAL at 09:06

## 2022-06-27 NOTE — DISCHARGE INSTRUCTIONS
Procedure Date  6/27/22     Impression  Overall Impression: Normal ileocolonoscopy.     Recommendation  Repeat screening colonoscopy in 10 years

## 2022-06-27 NOTE — ANESTHESIA PREPROCEDURE EVALUATION
06/27/2022  Kaylyn Silva is a 57 y.o., female.      Pre-op Assessment    I have reviewed the Patient Summary Reports.     I have reviewed the Nursing Notes. I have reviewed the NPO Status.   I have reviewed the Medications.     Review of Systems  Anesthesia Hx:  No problems with previous Anesthesia    Social:  Non-Smoker    Cardiovascular:   Hypertension    Pulmonary:   Sleep Apnea, CPAP    Hepatic/GI:   GERD    Neurological:   Neuromuscular Disease, Headaches    Psych:   Psychiatric History depression          Physical Exam  General: Well nourished        Anesthesia Plan  Type of Anesthesia, risks & benefits discussed:    Anesthesia Type: Gen Natural Airway  Intra-op Monitoring Plan: Standard ASA Monitors  Post Op Pain Control Plan: IV/PO Opioids PRN  Induction:  IV  Informed Consent: Informed consent signed with the Patient and all parties understand the risks and agree with anesthesia plan.  All questions answered. Patient consented to blood products? Yes  ASA Score: 3  Day of Surgery Review of History & Physical: H&P Update referred to the surgeon/provider.I have interviewed and examined the patient. I have reviewed the patient's H&P dated: There are no significant changes.     Ready For Surgery From Anesthesia Perspective.     .

## 2022-06-27 NOTE — H&P
History of Present Illness    Kaylyn Silva is a 57 y.o. female that  has a past medical history of Anxiety state (2012), Chemotherapy-induced neuropathy, Depression, unspecified, Edema of lower extremity, Endometrial cancer, GERD (gastroesophageal reflux disease) (2012), Pain in leg, unspecified, and Paresthesia of skin.     On naproxen and ibuprofen.  Here for index colonoscopy.    Patient otherwise denies any:  - black stools  - bloody stools  - nausea  - vomiting  - diarrhea  - constipation  - abdominal pain  - family history of GI related malignancies    ROS  - 12 point review of systems is negative except as otherwise stated in HPI.    Past Medical History:   Diagnosis Date    Anxiety state 2012    Chemotherapy-induced neuropathy     Depression, unspecified     Edema of lower extremity     Endometrial cancer     GERD (gastroesophageal reflux disease) 2012    Pain in leg, unspecified     Paresthesia of skin        Past Surgical History:   Procedure Laterality Date     SECTION      2 times    HAND SURGERY Left 1985    partial amp of thumb and index finger    HYSTERECTOMY      MASS EXCISION      Back of neck    VAGINAL DELIVERY      X1       Family History   Problem Relation Age of Onset    Hypertension Mother     Ovarian cancer Mother     Hyperlipidemia Father     Diabetes Brother     Hypertension Brother     Lung cancer Brother     No Known Problems Son     No Known Problems Son     No Known Problems Daughter        Social History     Socioeconomic History    Marital status:    Tobacco Use    Smoking status: Never Smoker    Smokeless tobacco: Never Used   Substance and Sexual Activity    Alcohol use: Yes     Comment: occasionaly    Drug use: Not Currently    Sexual activity: Not Currently       Current Outpatient Medications   Medication Sig Dispense Refill    albuterol (PROVENTIL/VENTOLIN HFA) 90 mcg/actuation inhaler INHALE 1 PUFF BY MOUTH EVERY 4 HOURS AS NEEDED  ...SHAKE WELL BEFORE USING...      DULoxetine (CYMBALTA) 30 MG capsule Take one capsule twice a day 60 capsule 3    ergocalciferol (VITAMIN D2) 50,000 unit Cap Take one capsule monthly 12 capsule 1    gabapentin (NEURONTIN) 300 MG capsule Take 300 mg by mouth every evening.      ibuprofen (ADVIL,MOTRIN) 600 MG tablet TAKE 1 TABLET BY MOUTH 4 TIMES DAILY WITH FOOD OR MILK ...STOP TAKING ALL OTHER NSAIDS WHILE TAKING THIS MEDICATION      naproxen (NAPROSYN) 500 MG tablet Take one tablet twice a day with food as needed for headache, no more than two in a day or two days in a week 30 tablet 3    promethazine (PHENERGAN) 12.5 MG Tab Take one tablet every 8 hours as needed for headache, no more than two in a day or two days in a week, no driving when taking this medication 30 tablet 3    tiZANidine (ZANAFLEX) 4 MG tablet Take 4 mg by mouth every 6 (six) hours as needed (muscle spasms).       No current facility-administered medications for this encounter.       Review of patient's allergies indicates:   Allergen Reactions    [place] Rash       Objective:  There were no vitals filed for this visit.     Constitutional:       General: no acute distress.     Appearance: Normal appearance. Non toxic-appearing.   HENT:      Head: Normocephalic and atraumatic.      Mouth/Throat:      Pharynx: Oropharynx is clear. No posterior oropharyngeal erythema.   Eyes:      General: No scleral icterus.     Conjunctiva/sclera: Conjunctivae normal.   Cardiovascular:      Rate and Rhythm: Normal rate and regular rhythm.      Heart sounds: Normal heart sounds.   Pulmonary:      Effort: Pulmonary effort is normal.      Breath sounds: Normal breath sounds.   Abdominal:      General: Abdomen is flat. There is no distension.      Palpations: Abdomen is soft. There is no mass.      Tenderness: There is no abdominal tenderness. There is no guarding.   Musculoskeletal:         General: No swelling or deformity.      Cervical back: Normal range of  motion and neck supple.   Skin:     General: Skin is warm and dry.   Neurological:      General: No focal deficit present.      Mental Status: alert and oriented to person, place, and time.     Assessment and Plan:  Proceed with:  Colonoscopy for screening for colon cancer    Louie Rome MD  Gastroenterology

## 2022-06-27 NOTE — ANESTHESIA POSTPROCEDURE EVALUATION
Anesthesia Post Evaluation    Patient: Kaylyn Silva    Procedure(s) Performed: * No procedures listed *    Final Anesthesia Type: general      Patient location during evaluation: GI PACU  Patient participation: Yes- Able to Participate  Level of consciousness: awake and alert  Post-procedure vital signs: reviewed and stable  Pain management: adequate  Airway patency: patent    PONV status at discharge: No PONV  Anesthetic complications: no      Cardiovascular status: blood pressure returned to baseline and hemodynamically stable  Respiratory status: spontaneous ventilation  Hydration status: euvolemic  Follow-up not needed.  Comments: Pt voices appreciation for care          Vitals Value Taken Time   /77 06/27/22 0930   Temp 97.3 06/27/22 0930   Pulse 82 06/27/22 0930   Resp 15 06/27/22 0930   SpO2 99 % 06/27/22 0930   Vitals shown include unvalidated device data.      No case tracking events are documented in the log.      Pain/Irma Score: No data recorded

## 2022-06-27 NOTE — TRANSFER OF CARE
"Anesthesia Transfer of Care Note    Patient: Kaylyn Silva    Procedure(s) Performed: * No procedures listed *    Patient location: GI    Anesthesia Type: general    Transport from OR: Transported from OR on room air with adequate spontaneous ventilation. Continuous ECG monitoring in transport. Continuous SpO2 monitoring in transport    Post pain: adequate analgesia    Post assessment: no apparent anesthetic complications    Post vital signs: stable    Level of consciousness: sedated and responds to stimulation    Nausea/Vomiting: no nausea/vomiting    Complications: none    Transfer of care protocol was followedComments: Good SV continue, NAD, VSS, RTRN      Last vitals:   Visit Vitals  /74   Pulse 86   Temp 36.4 °C (97.6 °F)   Resp 15   Ht 5' 7" (1.702 m)   Wt 89.4 kg (197 lb)   SpO2 98%   Breastfeeding No   BMI 30.85 kg/m²     "

## 2022-08-25 NOTE — PROGRESS NOTES
Subjective:       Patient ID: Kaylyn Silva is a 57 y.o. female.    Chief Complaint:  No chief complaint on file.      History of Present Illness  This pleasant 57 year old right handed  female presents to the clinic for follow up of numbness in all 4 extremities and headaches. Patient states she has had a lot of stress since her last visit and the headaches have increased to 2 headaches per week.  Patient states headaches started in 2017. She rates the headaches as a 5 to 8 on a scale of 0 to 10. She denies a daily headache. Headaches are unilateral in the left temporal area with aching and throbbing pain that decreases he ADL's. Headache triggers include stress and cigarette smoke but are otherwise unknown and she denies any aura. Discussed avoiding the headache triggers. Headaches have a sudden onset and can last from 30 minutes to an hour. She denies any nausea or vomiting and denies any photophobia or phonophobia. Resting makes the headaches better and she is unsure what makes the headaches worse. She last seen her eye doctor 2 years ago and was encouraged again to get a routine eye exam. She denies any neck injury but did have a head injury in 2017 to the left temporal area. She is not on any preventive and has naproxen and phenergan for abortive therapy. She is tolerating these medications without side effects and states they do relieve the headaches. She does reported symptoms of MARY with snoring, fatigue and some morning headaches. She had a sleep study done on July 6, 2022 that did not reveal any significant sleep apnea per the report but was recommended to avoid weight gain.  Family history is negative for headaches and is positive for ovarian cancer, HTN, hypothyroidism, DM, TIA, and enlarged heart. Her PMH includes ovarian cancer 2017, bronchitis, depression, kidney stones and arthritis. She states she had 4 chemotherapy treatments for the ovarian cancer. She denies smoking or  drinking alcohol and states she had a total hysterectomy in 2017.      Patient states the increase in the cymbalta 30 mg to twice a day is helping with her neuropathy symptoms. She is tolerating this medication without side effects. She also has gabapentin 300 mg one three times a day but most days only takes it one time a day. She is tolerating the gabapentin without side effects. She has not had the EMG NCS yet due to a death in in the family and will get this done. Patient reports burning, numbness, and tingling in the bilateral hands and feet that is worse in the feet. She states the symptoms started in 2017 after her chemotherapy treatments. She still rates these symptoms as a 4 to 5 on a scale of 0 to 5 that is constant despite increasing the cymbalta. She reports aching and shooting pains that she rates as a 7 on a scale of 0 to 10. She denies diabetes, denies neck injury and denies any back injury. She does report bilateral hand weakness. Phalen and Tinel sign positive on exam. She reports chronic back pain in the lumbar area that started in 2017 and that she rates as a 7 on a scale of 0 to 10. She reports back pain radiates to the legs and is intermittent. She denies any neck pain. She has Zanaflex 4 mg and gabapentin 300 mg for her muscle spasms and back pain. She is tolerating these medications without side effects. She states she completed physical therapy in January 2022 and it did help some. Discussed increasing the Cymbalta to 30 mg in the morning and 60 mg at night to help with the stress and headaches. She is in agreement with this. Discussed the plan in detail with Neurologist Dr. AGUSTINA Bean and the patient and they are in agreement with the plan. All her questions were answered at today's visit.       MRI of the brain with and without contrast done on March 3, 2022 showed There are a few scattered nonspecific white matter foci of abnormal signal.  These are nonspecific.  They can be incidental,  related to migraine headache syndrome are vasculitis, or due to chronic small vessel ischemia.          Review of Systems  Review of Systems   Constitutional:  Negative for activity change, diaphoresis, fever and unexpected weight change.   HENT:  Negative for congestion, ear pain, facial swelling, hearing loss, tinnitus, trouble swallowing and voice change.    Eyes:  Negative for photophobia, pain and visual disturbance.   Respiratory:  Negative for chest tightness, shortness of breath and wheezing.    Cardiovascular:  Negative for chest pain, palpitations and leg swelling.   Gastrointestinal:  Negative for constipation, diarrhea, nausea and vomiting.   Genitourinary:  Negative for difficulty urinating.   Musculoskeletal:  Negative for back pain, gait problem, neck pain and neck stiffness.   Skin:  Negative for color change, pallor, rash and wound.   Neurological:  Positive for numbness and headaches. Negative for dizziness, tremors, seizures, syncope, facial asymmetry, speech difficulty, weakness and light-headedness.   Psychiatric/Behavioral:  Negative for agitation, behavioral problems, confusion, decreased concentration and hallucinations. The patient is not nervous/anxious and is not hyperactive.        Objective:      Neurologic Exam     Mental Status   Oriented to person, place, and time.   Oriented to person.   Oriented to place.   Oriented to time.   Attention: normal. Concentration: normal.   Speech: speech is normal   Level of consciousness: alert  Knowledge: good.     Cranial Nerves     CN II   Visual fields full to confrontation.     CN III, IV, VI   Pupils are equal, round, and reactive to light.  Extraocular motions are normal.   Right pupil: Size: 3 mm. Shape: regular. Reactivity: brisk.   Left pupil: Size: 3 mm. Shape: regular. Reactivity: brisk.   CN III: no CN III palsy  CN VI: no CN VI palsy    CN V   Facial sensation intact.     CN VII   Facial expression full, symmetric.     CN VIII   CN VIII  normal.   Hearing: intact    CN IX, X   CN IX normal.   CN X normal.     CN XI   CN XI normal.     CN XII   CN XII normal.     Motor Exam   Muscle bulk: normal  Overall muscle tone: normal  Right arm pronator drift: absent  Left arm pronator drift: absent    Strength   Right deltoid: 5/5  Left deltoid: 5/5  Right biceps: 5/5  Left biceps: 5/5  Right triceps: 5/5  Left triceps: 5/5  Right quadriceps: 4/5  Left quadriceps: 4/5  Right hamstrin/5  Left hamstrin/5    Sensory Exam   Light touch normal.     Tinel's and Phalen sign positive      Gait, Coordination, and Reflexes     Gait  Gait: normal    Coordination   Romberg: negative  Finger to nose coordination: normal  Heel to shin coordination: normal  Tandem walking coordination: normal    Tremor   Resting tremor: absent  Intention tremor: absent  Action tremor: absent    Reflexes   Right brachioradialis: 2+  Left brachioradialis: 2+  Right biceps: 2+  Left biceps: 2+  Right triceps: 2+  Left triceps: 2+  Right patellar: 2+  Left patellar: 2+  Right achilles: 2+  Left achilles: 2+  Right : 4+  Left : 4+      Physical Exam  Constitutional:       General: She is not in acute distress.  HENT:      Head: Normocephalic.      Nose: Nose normal.      Mouth/Throat:      Mouth: Mucous membranes are moist.   Eyes:      Extraocular Movements: Extraocular movements intact and EOM normal.      Pupils: Pupils are equal, round, and reactive to light.   Cardiovascular:      Rate and Rhythm: Normal rate and regular rhythm.      Heart sounds: Normal heart sounds. No murmur heard.  Pulmonary:      Effort: Pulmonary effort is normal. No respiratory distress.      Breath sounds: Normal breath sounds. No wheezing, rhonchi or rales.   Musculoskeletal:         General: No swelling, tenderness, deformity or signs of injury. Normal range of motion.      Cervical back: Normal range of motion. No rigidity or tenderness.      Right lower leg: No edema.      Left lower leg: No  edema.   Skin:     General: Skin is warm and dry.      Capillary Refill: Capillary refill takes less than 2 seconds.      Coloration: Skin is not jaundiced or pale.      Findings: No bruising, erythema, lesion or rash.   Neurological:      Mental Status: She is alert and oriented to person, place, and time.      Coordination: Finger-Nose-Finger Test, Heel to Shin Test and Romberg Test normal.      Gait: Gait is intact. Tandem walk normal.      Deep Tendon Reflexes:      Reflex Scores:       Tricep reflexes are 2+ on the right side and 2+ on the left side.       Bicep reflexes are 2+ on the right side and 2+ on the left side.       Brachioradialis reflexes are 2+ on the right side and 2+ on the left side.       Patellar reflexes are 2+ on the right side and 2+ on the left side.       Achilles reflexes are 2+ on the right side and 2+ on the left side.  Psychiatric:         Mood and Affect: Mood normal.         Speech: Speech normal.         Behavior: Behavior normal.         Assessment:     Problem List Items Addressed This Visit          Neuro    Nonintractable headache (Chronic)    Relevant Medications    promethazine (PHENERGAN) 12.5 MG Tab    naproxen (NAPROSYN) 500 MG tablet    Idiopathic peripheral neuropathy - Primary (Chronic)    Relevant Medications    DULoxetine (CYMBALTA) 30 MG capsule    Bilateral carpal tunnel syndrome    Relevant Medications    DULoxetine (CYMBALTA) 30 MG capsule       Endocrine    Vitamin D deficiency       Other    Sleep apnea         Plan:       1. Continue zanaflex and gabapentin per PCP  2. Get EMG NCS all 4 extremities with [facility] to evaluate neuropathy rescheduled  3. Renew and increase cymbalta 30 mg one in the morning and two at night   4. Avoid headache triggers  5. Continue vitamin d 50,000 IU one capsule monthly  6. Follow up with eye doctor for routine eye exam   7. Wear left wrist splint at night and if it helps then wear wrist splint on the right  8. Renew and Continue  naproxen 500 mg one tablet twice a day with food as needed for headache, no more than two in a day or two days in a week  9. Renew and continue phenergan 12.5 mg one tablet every 8 hours as needed for headache, no more than two in a day or two days in a week, no driving when taking this medication  10. Take B12 500 mcg over the counter one daily  11. Follow up with PCP for medical management and lab work  12. Follow up with neurology in 3 months or sooner if needed

## 2022-08-30 ENCOUNTER — OFFICE VISIT (OUTPATIENT)
Dept: NEUROLOGY | Facility: CLINIC | Age: 57
End: 2022-08-30
Payer: COMMERCIAL

## 2022-08-30 VITALS
OXYGEN SATURATION: 96 % | DIASTOLIC BLOOD PRESSURE: 64 MMHG | BODY MASS INDEX: 31.23 KG/M2 | HEIGHT: 67 IN | SYSTOLIC BLOOD PRESSURE: 112 MMHG | HEART RATE: 85 BPM | WEIGHT: 199 LBS

## 2022-08-30 DIAGNOSIS — G60.9 IDIOPATHIC PERIPHERAL NEUROPATHY: Primary | ICD-10-CM

## 2022-08-30 DIAGNOSIS — G56.03 BILATERAL CARPAL TUNNEL SYNDROME: ICD-10-CM

## 2022-08-30 DIAGNOSIS — E55.9 VITAMIN D DEFICIENCY: ICD-10-CM

## 2022-08-30 DIAGNOSIS — R51.9 NONINTRACTABLE HEADACHE, UNSPECIFIED CHRONICITY PATTERN, UNSPECIFIED HEADACHE TYPE: ICD-10-CM

## 2022-08-30 DIAGNOSIS — G47.30 SLEEP APNEA, UNSPECIFIED TYPE: ICD-10-CM

## 2022-08-30 PROCEDURE — 99214 PR OFFICE/OUTPT VISIT, EST, LEVL IV, 30-39 MIN: ICD-10-PCS | Mod: S$PBB,,, | Performed by: NURSE PRACTITIONER

## 2022-08-30 PROCEDURE — 3008F BODY MASS INDEX DOCD: CPT | Mod: CPTII,,, | Performed by: NURSE PRACTITIONER

## 2022-08-30 PROCEDURE — 1160F PR REVIEW ALL MEDS BY PRESCRIBER/CLIN PHARMACIST DOCUMENTED: ICD-10-PCS | Mod: CPTII,,, | Performed by: NURSE PRACTITIONER

## 2022-08-30 PROCEDURE — 3078F PR MOST RECENT DIASTOLIC BLOOD PRESSURE < 80 MM HG: ICD-10-PCS | Mod: CPTII,,, | Performed by: NURSE PRACTITIONER

## 2022-08-30 PROCEDURE — 1160F RVW MEDS BY RX/DR IN RCRD: CPT | Mod: CPTII,,, | Performed by: NURSE PRACTITIONER

## 2022-08-30 PROCEDURE — 3074F SYST BP LT 130 MM HG: CPT | Mod: CPTII,,, | Performed by: NURSE PRACTITIONER

## 2022-08-30 PROCEDURE — 99214 OFFICE O/P EST MOD 30 MIN: CPT | Mod: S$PBB,,, | Performed by: NURSE PRACTITIONER

## 2022-08-30 PROCEDURE — 1159F PR MEDICATION LIST DOCUMENTED IN MEDICAL RECORD: ICD-10-PCS | Mod: CPTII,,, | Performed by: NURSE PRACTITIONER

## 2022-08-30 PROCEDURE — 1159F MED LIST DOCD IN RCRD: CPT | Mod: CPTII,,, | Performed by: NURSE PRACTITIONER

## 2022-08-30 PROCEDURE — 3074F PR MOST RECENT SYSTOLIC BLOOD PRESSURE < 130 MM HG: ICD-10-PCS | Mod: CPTII,,, | Performed by: NURSE PRACTITIONER

## 2022-08-30 PROCEDURE — 99214 OFFICE O/P EST MOD 30 MIN: CPT | Mod: PBBFAC | Performed by: NURSE PRACTITIONER

## 2022-08-30 PROCEDURE — 3078F DIAST BP <80 MM HG: CPT | Mod: CPTII,,, | Performed by: NURSE PRACTITIONER

## 2022-08-30 PROCEDURE — 3008F PR BODY MASS INDEX (BMI) DOCUMENTED: ICD-10-PCS | Mod: CPTII,,, | Performed by: NURSE PRACTITIONER

## 2022-08-30 RX ORDER — NAPROXEN 500 MG/1
TABLET ORAL
Qty: 30 TABLET | Refills: 3 | Status: SHIPPED | OUTPATIENT
Start: 2022-08-30 | End: 2024-03-21

## 2022-08-30 RX ORDER — DULOXETIN HYDROCHLORIDE 30 MG/1
CAPSULE, DELAYED RELEASE ORAL
Qty: 90 CAPSULE | Refills: 3 | Status: SHIPPED | OUTPATIENT
Start: 2022-08-30

## 2022-08-30 RX ORDER — PROMETHAZINE HYDROCHLORIDE 12.5 MG/1
TABLET ORAL
Qty: 30 TABLET | Refills: 3 | Status: SHIPPED | OUTPATIENT
Start: 2022-08-30

## 2022-08-30 RX ORDER — TRAMADOL HYDROCHLORIDE 50 MG/1
TABLET ORAL
COMMUNITY
Start: 2022-08-04

## 2022-08-30 NOTE — PATIENT INSTRUCTIONS
1. Continue zanaflex and gabapentin per PCP  2. Get EMG NCS all 4 extremities with [facility] to evaluate neuropathy rescheduled  3. Renew and increase cymbalta 30 mg one in the morning and two at night   4. Avoid headache triggers  5. Continue vitamin d 50,000 IU one capsule monthly  6. Follow up with eye doctor for routine eye exam   7. Wear left wrist splint at night and if it helps then wear wrist splint on the right  8. Renew and Continue naproxen 500 mg one tablet twice a day with food as needed for headache, no more than two in a day or two days in a week  9. Renew and continue phenergan 12.5 mg one tablet every 8 hours as needed for headache, no more than two in a day or two days in a week, no driving when taking this medication  10. Take B12 500 mcg over the counter one daily  11. Follow up with PCP for medical management and lab work  12. Follow up with neurology in 3 months or sooner if needed

## 2024-03-20 DIAGNOSIS — M25.571 ACUTE RIGHT ANKLE PAIN: Primary | ICD-10-CM

## 2024-03-21 ENCOUNTER — HOSPITAL ENCOUNTER (OUTPATIENT)
Dept: RADIOLOGY | Facility: HOSPITAL | Age: 59
Discharge: HOME | End: 2024-03-21
Payer: MEDICARE

## 2024-03-21 ENCOUNTER — OFFICE VISIT (OUTPATIENT)
Dept: ORTHOPEDICS | Facility: CLINIC | Age: 59
End: 2024-03-21
Payer: MEDICARE

## 2024-03-21 VITALS — WEIGHT: 199 LBS | BODY MASS INDEX: 31.23 KG/M2 | HEIGHT: 67 IN

## 2024-03-21 DIAGNOSIS — M25.571 CHRONIC PAIN OF RIGHT ANKLE: Primary | ICD-10-CM

## 2024-03-21 DIAGNOSIS — G89.29 CHRONIC PAIN OF RIGHT ANKLE: Primary | ICD-10-CM

## 2024-03-21 DIAGNOSIS — M25.571 ACUTE RIGHT ANKLE PAIN: ICD-10-CM

## 2024-03-21 PROCEDURE — 73610 X-RAY EXAM OF ANKLE: CPT | Mod: TC,RT

## 2024-03-21 PROCEDURE — 73610 X-RAY EXAM OF ANKLE: CPT | Mod: 26,RT,, | Performed by: RADIOLOGY

## 2024-03-21 PROCEDURE — 99213 OFFICE O/P EST LOW 20 MIN: CPT | Mod: PBBFAC,25

## 2024-03-21 PROCEDURE — 99203 OFFICE O/P NEW LOW 30 MIN: CPT | Mod: S$PBB,,,

## 2024-03-21 RX ORDER — NAPROXEN 500 MG/1
500 TABLET ORAL 2 TIMES DAILY WITH MEALS
Qty: 28 TABLET | Refills: 0 | Status: SHIPPED | OUTPATIENT
Start: 2024-03-21 | End: 2024-04-04

## 2024-03-21 NOTE — PROGRESS NOTES
CC:   Chief Complaint   Patient presents with    Right Ankle - Pain          Kaylyn Silva is a 58 y.o. female seen today for Pain of the Right Ankle  Patient admits to a several year history of chronic pain of right ankle.  Patient states that she has tripped and twisted this ankle at least 3 times, most recently being at a  last year when she stepped in a hole.  She reports constant ankle pain located on the outside of her ankle and occasional swelling of the ankle.  Patient states that she would intermittently takes either Tylenol or Advil for pain relief.  She denies any other recent fall or injury.  No other complaints today.        PAST MEDICAL HISTORY:   Past Medical History:   Diagnosis Date    Anxiety state 2012    Chemotherapy-induced neuropathy     Depression, unspecified     Edema of lower extremity     Endometrial cancer     GERD (gastroesophageal reflux disease) 2012    Pain in leg, unspecified     Paresthesia of skin           PAST SURGICAL HISTORY:   Past Surgical History:   Procedure Laterality Date     SECTION      2 times    HAND SURGERY Left 1985    partial amp of thumb and index finger    HYSTERECTOMY      MASS EXCISION      Back of neck    VAGINAL DELIVERY      X1          ALLERGIES:   Review of patient's allergies indicates:   Allergen Reactions    [place] Rash        MEDICATIONS :    Current Outpatient Medications:     albuterol (PROVENTIL/VENTOLIN HFA) 90 mcg/actuation inhaler, INHALE 1 PUFF BY MOUTH EVERY 4 HOURS AS NEEDED ...SHAKE WELL BEFORE USING..., Disp: , Rfl:     DULoxetine (CYMBALTA) 30 MG capsule, Take one capsule in the morning and two capsules at night, Disp: 90 capsule, Rfl: 3    ergocalciferol (VITAMIN D2) 50,000 unit Cap, Take one capsule monthly, Disp: 12 capsule, Rfl: 1    gabapentin (NEURONTIN) 300 MG capsule, Take 300 mg by mouth every evening., Disp: , Rfl:     naproxen (NAPROSYN) 500 MG tablet, Take 1 tablet (500 mg  total) by mouth 2 (two) times daily with meals. for 14 days, Disp: 28 tablet, Rfl: 0    promethazine (PHENERGAN) 12.5 MG Tab, Take one tablet every 8 hours as needed for headache, no more than two in a day or two days in a week, no driving when taking this medication, Disp: 30 tablet, Rfl: 3    tiZANidine (ZANAFLEX) 4 MG tablet, Take 4 mg by mouth every 6 (six) hours as needed (muscle spasms)., Disp: , Rfl:     traMADoL (ULTRAM) 50 mg tablet, TAKE 1 TABLET BY MOUTH EVERY 8 HOURS AS NEEDED FOR PAIN CAUSES DROWSINESS-AVOID ALCOHOL!!, Disp: , Rfl:      SOCIAL HISTORY:   Social History     Socioeconomic History    Marital status:    Tobacco Use    Smoking status: Never    Smokeless tobacco: Never   Substance and Sexual Activity    Alcohol use: Not Currently    Drug use: Not Currently    Sexual activity: Not Currently        FAMILY HISTORY:   Family History   Problem Relation Age of Onset    Hypertension Mother     Ovarian cancer Mother     Hyperlipidemia Father     Diabetes Brother     Hypertension Brother     Lung cancer Brother     No Known Problems Son     No Known Problems Son     No Known Problems Daughter           PHYSICAL EXAM:      There were no vitals filed for this visit.  Body mass index is 31.17 kg/m².    GENERAL: Well-developed, well-nourished female . The patient is alert, oriented and cooperative.    HEENT:  Normocephalic, atraumatic.  Extraocular movements are intact bilaterally.     NECK:  Nontender with good range of motion.    LUNGS:  Clear to auscultation bilaterally.    HEART:  Regular rate and rhythm.     ABDOMEN:  Soft, non-tender, non-distended.      EXTREMITIES:  Right ankle was skin clean dry and intact, tenderness to palpation along lateral malleolus and inferior to lateral malleolus, minimal soft tissue swelling, no bruising noted on exam, good range of motion with dorsiflexion and plantar flexion but not without pain, pain elicited with resisted motion of ankle, neurovascularly  intact      RADIOGRAPHIC FINDINGS:   X-Ray Ankle Complete Right    Result Date: 3/21/2024  EXAMINATION: XR ANKLE COMPLETE 3 VIEW RIGHT CLINICAL HISTORY: Pain in right ankle and joints of right foot COMPARISON: None available TECHNIQUE: XR ANKLE 3 VIEW RIGHT FINDINGS: No evidence of fracture seen.  The alignment of the joints appears normal.  Mild ankle degenerative change is present.  No soft tissue abnormality is seen.     Mild ankle osteoarthrosis. Electronically signed by: Reuben Manuel Date:    03/21/2024 Time:    14:37       Patient Active Problem List    Diagnosis Date Noted    Vitamin D deficiency 08/30/2022    Depressed 05/23/2022    Essential hypertension 05/23/2022    Neuropathic pain 05/23/2022    Obesity 05/23/2022    Uterine cancer 05/23/2022    Nonintractable headache 02/16/2022    Idiopathic peripheral neuropathy 02/16/2022    Bilateral carpal tunnel syndrome 02/16/2022    Sleep apnea 02/16/2022    Chemotherapy-induced neuropathy 06/14/2021    Multiple joint pain 04/22/2021    Muscle spasm 04/22/2021    Neuropathy 04/22/2021    Hyperlipidemia 03/23/2021    Gastroesophageal reflux disease without esophagitis 03/23/2021    Neck mass 02/04/2020    Peripheral venous insufficiency 12/11/2017    Endometrial cancer 10/19/2017    AYSHA III (cervical intraepithelial neoplasia grade III) with severe dysplasia 09/25/2017    Atypical glandular cells of undetermined significance (RIVER) on cervical Pap smear 09/25/2017     IMPRESSION AND PLAN:  Chronic right ankle pain.  Personally reviewed x-rays today without any signs of fracture or dislocation.  Discussed all treatment options with the patient.  Discussed RICE therapy, oral/topical NSAIDs for pain relief, we will give prescription for naproxen today.  We will also give patient lace-up ankle brace to be worn during the day.  Discussed that if pain does not improve or worsens over the next 2 weeks to follow up and we will consider physical therapy or MRI at that  time.  Otherwise follow up p.r.n..    No follow-ups on file.       Malini Zapata PA-C      (Subject to voice recognition error, transcription service not allowed)

## 2024-03-21 NOTE — PATIENT INSTRUCTIONS
Chronic right ankle pain.  Personally reviewed x-rays today without any signs of fracture or dislocation.  Discussed all treatment options with the patient.  Discussed RICE therapy, oral/topical NSAIDs for pain relief, we will give prescription for naproxen today.  We will also give patient lace-up ankle brace to be worn during the day.  Discussed that if pain does not improve or worsens over the next 2 weeks to follow up and we will consider physical therapy or MRI at that time.  Otherwise follow up p.r.n..

## 2025-02-21 ENCOUNTER — HOSPITAL ENCOUNTER (OUTPATIENT)
Dept: RADIOLOGY | Facility: HOSPITAL | Age: 60
Discharge: HOME | End: 2025-02-21
Attending: ORTHOPAEDIC SURGERY
Payer: MEDICARE

## 2025-02-21 ENCOUNTER — OFFICE VISIT (OUTPATIENT)
Dept: ORTHOPEDICS | Facility: CLINIC | Age: 60
End: 2025-02-21
Payer: MEDICARE

## 2025-02-21 VITALS
HEIGHT: 67 IN | SYSTOLIC BLOOD PRESSURE: 115 MMHG | DIASTOLIC BLOOD PRESSURE: 64 MMHG | WEIGHT: 205 LBS | BODY MASS INDEX: 32.18 KG/M2

## 2025-02-21 DIAGNOSIS — M25.512 LEFT SHOULDER PAIN, UNSPECIFIED CHRONICITY: ICD-10-CM

## 2025-02-21 DIAGNOSIS — M25.512 LEFT SHOULDER PAIN, UNSPECIFIED CHRONICITY: Primary | ICD-10-CM

## 2025-02-21 PROCEDURE — 99214 OFFICE O/P EST MOD 30 MIN: CPT | Mod: PBBFAC,25 | Performed by: ORTHOPAEDIC SURGERY

## 2025-02-21 PROCEDURE — 73030 X-RAY EXAM OF SHOULDER: CPT | Mod: TC,LT

## 2025-02-21 PROCEDURE — 20552 NJX 1/MLT TRIGGER POINT 1/2: CPT | Mod: PBBFAC | Performed by: ORTHOPAEDIC SURGERY

## 2025-02-21 PROCEDURE — 73030 X-RAY EXAM OF SHOULDER: CPT | Mod: 26,LT,, | Performed by: ORTHOPAEDIC SURGERY

## 2025-02-21 RX ORDER — BUPIVACAINE HYDROCHLORIDE 2.5 MG/ML
1 INJECTION, SOLUTION INFILTRATION; PERINEURAL
Status: COMPLETED | OUTPATIENT
Start: 2025-02-21 | End: 2025-02-21

## 2025-02-21 RX ORDER — BETAMETHASONE SODIUM PHOSPHATE AND BETAMETHASONE ACETATE 3; 3 MG/ML; MG/ML
6 INJECTION, SUSPENSION INTRA-ARTICULAR; INTRALESIONAL; INTRAMUSCULAR; SOFT TISSUE
Status: COMPLETED | OUTPATIENT
Start: 2025-02-21 | End: 2025-02-21

## 2025-02-21 RX ADMIN — BUPIVACAINE HYDROCHLORIDE 2.5 MG: 2.5 INJECTION, SOLUTION INFILTRATION; PERINEURAL at 11:02

## 2025-02-21 RX ADMIN — BETAMETHASONE SODIUM PHOSPHATE AND BETAMETHASONE ACETATE 6 MG: 3; 3 INJECTION, SUSPENSION INTRA-ARTICULAR; INTRALESIONAL; INTRAMUSCULAR at 11:02

## 2025-02-21 NOTE — PROGRESS NOTES
CC:   Chief Complaint   Patient presents with    Left Shoulder - Pain        PREVIOUS INFO:        HISTORY:   2025    Kaylyn Silva  is a 59 y.o. 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 a lot of lifting helping her mother and lifting lot of objects a lot of stress and she is having lot of posterior pain denies neck pain denies numbness or tingling      PAST MEDICAL HISTORY:   Past Medical History:   Diagnosis Date    Anxiety state 2012    Chemotherapy-induced neuropathy     Depression, unspecified     Edema of lower extremity     Endometrial cancer     GERD (gastroesophageal reflux disease) 2012    Pain in leg, unspecified     Paresthesia of skin           PAST SURGICAL HISTORY:   Past Surgical History:   Procedure Laterality Date     SECTION      2 times    HAND SURGERY Left 1985    partial amp of thumb and index finger    HYSTERECTOMY      MASS EXCISION      Back of neck    VAGINAL DELIVERY      X1          ALLERGIES:   Review of patient's allergies indicates:   Allergen Reactions    [place] Rash        MEDICATIONS :  Current Medications[1]     SOCIAL HISTORY: Social History[2]     ROS    FAMILY HISTORY:   Family History   Problem Relation Name Age of Onset    Hypertension Mother      Ovarian cancer Mother      Hyperlipidemia Father      Diabetes Brother      Hypertension Brother      Lung cancer Brother      No Known Problems Son      No Known Problems Son      No Known Problems Daughter            PHYSICAL EXAM:   Vitals:    25 1101   BP: 115/64               Body mass index is 32.11 kg/m².     In general, this is a well-developed, well-nourished female . The patient is alert, oriented and cooperative.      HEENT:  Normocephalic, atraumatic.  Extraocular movements are intact bilaterally.  The oropharynx is benign.       NECK:  Nontender with good range of motion.      PULMONARY: Respirations  are even and non-labored.       CARDIOVASCULAR: Pulses regular by peripheral palpation.       ABDOMEN:  Soft, non-tender, non-distended.        EXTREMITIES:  Her neck has full range motion without pain  The left shoulder sternoclavicular joints nontender AC joints nontender subacromial space is nontender  She is tender along the superior medial border of her scapula  Forward flexion abduction both cause pain but it is running the posterior shoulder around the scapula    Ortho Exam      RADIOGRAPHIC FINDINGS:  Left shoulder AP transscapular lateral joints congruent reduced there is normal bone mineralization no fracture dislocation appreciated      .      IMPRESSION:  Left shoulder appears have trigger points talked to her we are going to try an injection and therapy    PLAN:  Trigger point injection giving along the posterior superior medial aspect of the scapula 1 cc of Celestone 1 cc of Marcaine prepping with Betadine  Referral to physical therapy for a scapular program  Be glad to see her back        No follow-ups on file.         Waylon Lucia III      (Subject to voice recognition error, transcription service not allowed)           [1]   Current Outpatient Medications:     gabapentin (NEURONTIN) 300 MG capsule, Take 300 mg by mouth every evening., Disp: , Rfl:     albuterol (PROVENTIL/VENTOLIN HFA) 90 mcg/actuation inhaler, INHALE 1 PUFF BY MOUTH EVERY 4 HOURS AS NEEDED ...SHAKE WELL BEFORE USING... (Patient not taking: Reported on 2/21/2025), Disp: , Rfl:     DULoxetine (CYMBALTA) 30 MG capsule, Take one capsule in the morning and two capsules at night (Patient not taking: Reported on 2/21/2025), Disp: 90 capsule, Rfl: 3    ergocalciferol (VITAMIN D2) 50,000 unit Cap, Take one capsule monthly (Patient not taking: Reported on 2/21/2025), Disp: 12 capsule, Rfl: 1    promethazine (PHENERGAN) 12.5 MG Tab, Take one tablet every 8 hours as needed for headache, no more than two in a day or two days in a week, no  driving when taking this medication (Patient not taking: Reported on 2/21/2025), Disp: 30 tablet, Rfl: 3    tiZANidine (ZANAFLEX) 4 MG tablet, Take 4 mg by mouth every 6 (six) hours as needed (muscle spasms). (Patient not taking: Reported on 2/21/2025), Disp: , Rfl:     traMADoL (ULTRAM) 50 mg tablet, TAKE 1 TABLET BY MOUTH EVERY 8 HOURS AS NEEDED FOR PAIN CAUSES DROWSINESS-AVOID ALCOHOL!! (Patient not taking: Reported on 2/21/2025), Disp: , Rfl:   [2]   Social History  Socioeconomic History    Marital status:    Tobacco Use    Smoking status: Never    Smokeless tobacco: Never   Substance and Sexual Activity    Alcohol use: Not Currently    Drug use: Not Currently    Sexual activity: Not Currently